# Patient Record
Sex: MALE | Race: WHITE | NOT HISPANIC OR LATINO | Employment: OTHER | URBAN - METROPOLITAN AREA
[De-identification: names, ages, dates, MRNs, and addresses within clinical notes are randomized per-mention and may not be internally consistent; named-entity substitution may affect disease eponyms.]

---

## 2017-01-01 ENCOUNTER — COMMUNICATION - HEALTHEAST (OUTPATIENT)
Dept: INTERNAL MEDICINE | Facility: CLINIC | Age: 47
End: 2017-01-01

## 2017-01-05 ENCOUNTER — COMMUNICATION - HEALTHEAST (OUTPATIENT)
Dept: UROLOGY | Facility: CLINIC | Age: 47
End: 2017-01-05

## 2017-01-05 ENCOUNTER — COMMUNICATION - HEALTHEAST (OUTPATIENT)
Dept: INTERNAL MEDICINE | Facility: CLINIC | Age: 47
End: 2017-01-05

## 2017-01-09 ENCOUNTER — COMMUNICATION - HEALTHEAST (OUTPATIENT)
Dept: INTERNAL MEDICINE | Facility: CLINIC | Age: 47
End: 2017-01-09

## 2017-01-09 DIAGNOSIS — S24.8XXD INJURY OF LONG THORACIC NERVE, SUBSEQUENT ENCOUNTER: ICD-10-CM

## 2017-01-23 ENCOUNTER — COMMUNICATION - HEALTHEAST (OUTPATIENT)
Dept: INTERNAL MEDICINE | Facility: CLINIC | Age: 47
End: 2017-01-23

## 2017-02-01 RX ORDER — LIDOCAINE 50 MG/G
OINTMENT TOPICAL
Qty: 35.44 G | Refills: 3 | Status: SHIPPED | OUTPATIENT
Start: 2017-02-01

## 2017-02-25 ENCOUNTER — COMMUNICATION - HEALTHEAST (OUTPATIENT)
Dept: INTERNAL MEDICINE | Facility: CLINIC | Age: 47
End: 2017-02-25

## 2017-02-25 DIAGNOSIS — N20.1 URETERAL STONE: ICD-10-CM

## 2017-03-10 ENCOUNTER — RECORDS - HEALTHEAST (OUTPATIENT)
Dept: ADMINISTRATIVE | Facility: OTHER | Age: 47
End: 2017-03-10

## 2017-03-27 ENCOUNTER — COMMUNICATION - HEALTHEAST (OUTPATIENT)
Dept: INTERNAL MEDICINE | Facility: CLINIC | Age: 47
End: 2017-03-27

## 2017-03-31 ENCOUNTER — COMMUNICATION - HEALTHEAST (OUTPATIENT)
Dept: INTERNAL MEDICINE | Facility: CLINIC | Age: 47
End: 2017-03-31

## 2017-03-31 DIAGNOSIS — R68.2 DRY MOUTH: ICD-10-CM

## 2017-04-17 ENCOUNTER — COMMUNICATION - HEALTHEAST (OUTPATIENT)
Dept: ADMINISTRATIVE | Facility: CLINIC | Age: 47
End: 2017-04-17

## 2017-04-26 ENCOUNTER — RECORDS - HEALTHEAST (OUTPATIENT)
Dept: ADMINISTRATIVE | Facility: OTHER | Age: 47
End: 2017-04-26

## 2017-04-28 ENCOUNTER — RECORDS - HEALTHEAST (OUTPATIENT)
Dept: ADMINISTRATIVE | Facility: OTHER | Age: 47
End: 2017-04-28

## 2017-04-30 ENCOUNTER — COMMUNICATION - HEALTHEAST (OUTPATIENT)
Dept: INTERNAL MEDICINE | Facility: CLINIC | Age: 47
End: 2017-04-30

## 2017-04-30 DIAGNOSIS — R52 PAIN: ICD-10-CM

## 2017-05-05 ENCOUNTER — COMMUNICATION - HEALTHEAST (OUTPATIENT)
Dept: INTERNAL MEDICINE | Facility: CLINIC | Age: 47
End: 2017-05-05

## 2017-05-05 ENCOUNTER — OFFICE VISIT - HEALTHEAST (OUTPATIENT)
Dept: INTERNAL MEDICINE | Facility: CLINIC | Age: 47
End: 2017-05-05

## 2017-05-05 DIAGNOSIS — M95.8 WINGED SCAPULA: ICD-10-CM

## 2017-05-05 DIAGNOSIS — R68.2 DRY MOUTH: ICD-10-CM

## 2017-05-05 ASSESSMENT — MIFFLIN-ST. JEOR: SCORE: 1667.99

## 2017-05-10 ENCOUNTER — RECORDS - HEALTHEAST (OUTPATIENT)
Dept: ADMINISTRATIVE | Facility: OTHER | Age: 47
End: 2017-05-10

## 2017-05-15 ENCOUNTER — COMMUNICATION - HEALTHEAST (OUTPATIENT)
Dept: INTERNAL MEDICINE | Facility: CLINIC | Age: 47
End: 2017-05-15

## 2017-05-27 ENCOUNTER — COMMUNICATION - HEALTHEAST (OUTPATIENT)
Dept: INTERNAL MEDICINE | Facility: CLINIC | Age: 47
End: 2017-05-27

## 2017-05-31 ENCOUNTER — COMMUNICATION - HEALTHEAST (OUTPATIENT)
Dept: INTERNAL MEDICINE | Facility: CLINIC | Age: 47
End: 2017-05-31

## 2017-05-31 DIAGNOSIS — F41.9 ANXIETY: ICD-10-CM

## 2017-06-07 ENCOUNTER — COMMUNICATION - HEALTHEAST (OUTPATIENT)
Dept: ADMINISTRATIVE | Facility: CLINIC | Age: 47
End: 2017-06-07

## 2017-06-19 ENCOUNTER — COMMUNICATION - HEALTHEAST (OUTPATIENT)
Dept: INTERNAL MEDICINE | Facility: CLINIC | Age: 47
End: 2017-06-19

## 2017-06-19 DIAGNOSIS — M95.8 WINGED SCAPULA: ICD-10-CM

## 2017-06-29 ENCOUNTER — COMMUNICATION - HEALTHEAST (OUTPATIENT)
Dept: INTERNAL MEDICINE | Facility: CLINIC | Age: 47
End: 2017-06-29

## 2017-06-30 ENCOUNTER — COMMUNICATION - HEALTHEAST (OUTPATIENT)
Dept: INTERNAL MEDICINE | Facility: CLINIC | Age: 47
End: 2017-06-30

## 2017-06-30 DIAGNOSIS — R68.2 DRY MOUTH: ICD-10-CM

## 2017-07-03 ENCOUNTER — RECORDS - HEALTHEAST (OUTPATIENT)
Dept: ADMINISTRATIVE | Facility: OTHER | Age: 47
End: 2017-07-03

## 2017-07-14 ENCOUNTER — COMMUNICATION - HEALTHEAST (OUTPATIENT)
Dept: INTERNAL MEDICINE | Facility: CLINIC | Age: 47
End: 2017-07-14

## 2017-07-17 ENCOUNTER — COMMUNICATION - HEALTHEAST (OUTPATIENT)
Dept: INTERNAL MEDICINE | Facility: CLINIC | Age: 47
End: 2017-07-17

## 2017-07-25 ENCOUNTER — COMMUNICATION - HEALTHEAST (OUTPATIENT)
Dept: INTERNAL MEDICINE | Facility: CLINIC | Age: 47
End: 2017-07-25

## 2017-08-04 ENCOUNTER — OFFICE VISIT - HEALTHEAST (OUTPATIENT)
Dept: INTERNAL MEDICINE | Facility: CLINIC | Age: 47
End: 2017-08-04

## 2017-08-04 DIAGNOSIS — M95.8 WINGED SCAPULA: ICD-10-CM

## 2017-08-17 ENCOUNTER — RECORDS - HEALTHEAST (OUTPATIENT)
Dept: ADMINISTRATIVE | Facility: OTHER | Age: 47
End: 2017-08-17

## 2017-08-23 ENCOUNTER — RECORDS - HEALTHEAST (OUTPATIENT)
Dept: ADMINISTRATIVE | Facility: OTHER | Age: 47
End: 2017-08-23

## 2017-09-28 ENCOUNTER — COMMUNICATION - HEALTHEAST (OUTPATIENT)
Dept: INTERNAL MEDICINE | Facility: CLINIC | Age: 47
End: 2017-09-28

## 2017-10-24 ENCOUNTER — COMMUNICATION - HEALTHEAST (OUTPATIENT)
Dept: INTERNAL MEDICINE | Facility: CLINIC | Age: 47
End: 2017-10-24

## 2017-10-24 DIAGNOSIS — R52 PAIN: ICD-10-CM

## 2017-10-26 ENCOUNTER — OFFICE VISIT - HEALTHEAST (OUTPATIENT)
Dept: INTERNAL MEDICINE | Facility: CLINIC | Age: 47
End: 2017-10-26

## 2017-10-26 DIAGNOSIS — M95.8 WINGED SCAPULA: ICD-10-CM

## 2017-10-27 ENCOUNTER — AMBULATORY - HEALTHEAST (OUTPATIENT)
Dept: INTERNAL MEDICINE | Facility: CLINIC | Age: 47
End: 2017-10-27

## 2017-10-27 ENCOUNTER — COMMUNICATION - HEALTHEAST (OUTPATIENT)
Dept: INTERNAL MEDICINE | Facility: CLINIC | Age: 47
End: 2017-10-27

## 2017-10-27 DIAGNOSIS — Z01.818 PRE-OP EXAM: ICD-10-CM

## 2017-10-28 ENCOUNTER — COMMUNICATION - HEALTHEAST (OUTPATIENT)
Dept: INTERNAL MEDICINE | Facility: CLINIC | Age: 47
End: 2017-10-28

## 2017-11-30 ENCOUNTER — COMMUNICATION - HEALTHEAST (OUTPATIENT)
Dept: INTERNAL MEDICINE | Facility: CLINIC | Age: 47
End: 2017-11-30

## 2017-11-30 DIAGNOSIS — F41.9 ANXIETY: ICD-10-CM

## 2017-12-07 ENCOUNTER — COMMUNICATION - HEALTHEAST (OUTPATIENT)
Dept: INTERNAL MEDICINE | Facility: CLINIC | Age: 47
End: 2017-12-07

## 2017-12-07 ENCOUNTER — OFFICE VISIT - HEALTHEAST (OUTPATIENT)
Dept: INTERNAL MEDICINE | Facility: CLINIC | Age: 47
End: 2017-12-07

## 2017-12-07 DIAGNOSIS — H60.90 OE (OTITIS EXTERNA): ICD-10-CM

## 2017-12-07 DIAGNOSIS — H91.90 HEARING LOSS: ICD-10-CM

## 2017-12-07 DIAGNOSIS — R68.2 DRY MOUTH: ICD-10-CM

## 2017-12-23 ENCOUNTER — COMMUNICATION - HEALTHEAST (OUTPATIENT)
Dept: INTERNAL MEDICINE | Facility: CLINIC | Age: 47
End: 2017-12-23

## 2017-12-23 DIAGNOSIS — R52 PAIN: ICD-10-CM

## 2017-12-29 ENCOUNTER — COMMUNICATION - HEALTHEAST (OUTPATIENT)
Dept: INTERNAL MEDICINE | Facility: CLINIC | Age: 47
End: 2017-12-29

## 2018-01-12 ENCOUNTER — COMMUNICATION - HEALTHEAST (OUTPATIENT)
Dept: INTERNAL MEDICINE | Facility: CLINIC | Age: 48
End: 2018-01-12

## 2018-01-12 DIAGNOSIS — S24.8XXS: ICD-10-CM

## 2018-01-12 DIAGNOSIS — S24.8XXS INJURY OF LONG THORACIC NERVE, SEQUELA: ICD-10-CM

## 2018-01-18 ENCOUNTER — OFFICE VISIT - HEALTHEAST (OUTPATIENT)
Dept: OTOLARYNGOLOGY | Facility: CLINIC | Age: 48
End: 2018-01-18

## 2018-01-18 ENCOUNTER — OFFICE VISIT - HEALTHEAST (OUTPATIENT)
Dept: AUDIOLOGY | Facility: CLINIC | Age: 48
End: 2018-01-18

## 2018-01-18 DIAGNOSIS — K21.9 GASTRIC REFLUX: ICD-10-CM

## 2018-01-18 DIAGNOSIS — J37.0 CHRONIC LARYNGITIS: ICD-10-CM

## 2018-01-18 DIAGNOSIS — G47.33 OSA (OBSTRUCTIVE SLEEP APNEA): ICD-10-CM

## 2018-01-18 DIAGNOSIS — H93.8X3 SENSATION OF FULLNESS IN BOTH EARS: ICD-10-CM

## 2018-01-18 DIAGNOSIS — H93.19 TINNITUS, UNSPECIFIED LATERALITY: ICD-10-CM

## 2018-01-18 DIAGNOSIS — G50.1 ATYPICAL FACE PAIN: ICD-10-CM

## 2018-01-19 ENCOUNTER — RECORDS - HEALTHEAST (OUTPATIENT)
Dept: ADMINISTRATIVE | Facility: OTHER | Age: 48
End: 2018-01-19

## 2018-01-27 ENCOUNTER — COMMUNICATION - HEALTHEAST (OUTPATIENT)
Dept: INTERNAL MEDICINE | Facility: CLINIC | Age: 48
End: 2018-01-27

## 2018-02-13 ENCOUNTER — RECORDS - HEALTHEAST (OUTPATIENT)
Dept: ADMINISTRATIVE | Facility: OTHER | Age: 48
End: 2018-02-13

## 2018-03-01 ENCOUNTER — COMMUNICATION - HEALTHEAST (OUTPATIENT)
Dept: INTERNAL MEDICINE | Facility: CLINIC | Age: 48
End: 2018-03-01

## 2018-03-05 ENCOUNTER — COMMUNICATION - HEALTHEAST (OUTPATIENT)
Dept: INTERNAL MEDICINE | Facility: CLINIC | Age: 48
End: 2018-03-05

## 2018-03-14 ENCOUNTER — COMMUNICATION - HEALTHEAST (OUTPATIENT)
Dept: INTERNAL MEDICINE | Facility: CLINIC | Age: 48
End: 2018-03-14

## 2018-03-20 ENCOUNTER — RECORDS - HEALTHEAST (OUTPATIENT)
Dept: ADMINISTRATIVE | Facility: OTHER | Age: 48
End: 2018-03-20

## 2018-03-20 ENCOUNTER — COMMUNICATION - HEALTHEAST (OUTPATIENT)
Dept: INTERNAL MEDICINE | Facility: CLINIC | Age: 48
End: 2018-03-20

## 2018-03-20 DIAGNOSIS — R68.2 DRY MOUTH: ICD-10-CM

## 2018-03-26 ENCOUNTER — COMMUNICATION - HEALTHEAST (OUTPATIENT)
Dept: INTERNAL MEDICINE | Facility: CLINIC | Age: 48
End: 2018-03-26

## 2018-03-26 DIAGNOSIS — R52 PAIN: ICD-10-CM

## 2018-03-27 ENCOUNTER — RECORDS - HEALTHEAST (OUTPATIENT)
Dept: ADMINISTRATIVE | Facility: OTHER | Age: 48
End: 2018-03-27

## 2018-04-02 ENCOUNTER — COMMUNICATION - HEALTHEAST (OUTPATIENT)
Dept: INTERNAL MEDICINE | Facility: CLINIC | Age: 48
End: 2018-04-02

## 2018-04-06 ENCOUNTER — RECORDS - HEALTHEAST (OUTPATIENT)
Dept: ADMINISTRATIVE | Facility: OTHER | Age: 48
End: 2018-04-06

## 2018-05-01 ENCOUNTER — COMMUNICATION - HEALTHEAST (OUTPATIENT)
Dept: INTERNAL MEDICINE | Facility: CLINIC | Age: 48
End: 2018-05-01

## 2018-05-31 ENCOUNTER — OFFICE VISIT - HEALTHEAST (OUTPATIENT)
Dept: INTERNAL MEDICINE | Facility: CLINIC | Age: 48
End: 2018-05-31

## 2018-05-31 DIAGNOSIS — M95.8 WINGED SCAPULA: ICD-10-CM

## 2018-06-01 ENCOUNTER — COMMUNICATION - HEALTHEAST (OUTPATIENT)
Dept: INTERNAL MEDICINE | Facility: CLINIC | Age: 48
End: 2018-06-01

## 2018-06-01 DIAGNOSIS — R52 PAIN: ICD-10-CM

## 2018-06-01 DIAGNOSIS — R68.2 DRY MOUTH: ICD-10-CM

## 2018-06-11 ENCOUNTER — RECORDS - HEALTHEAST (OUTPATIENT)
Dept: ADMINISTRATIVE | Facility: OTHER | Age: 48
End: 2018-06-11

## 2018-06-12 ENCOUNTER — RECORDS - HEALTHEAST (OUTPATIENT)
Dept: ADMINISTRATIVE | Facility: OTHER | Age: 48
End: 2018-06-12

## 2018-06-25 ENCOUNTER — RECORDS - HEALTHEAST (OUTPATIENT)
Dept: ADMINISTRATIVE | Facility: OTHER | Age: 48
End: 2018-06-25

## 2018-07-01 ENCOUNTER — COMMUNICATION - HEALTHEAST (OUTPATIENT)
Dept: INTERNAL MEDICINE | Facility: CLINIC | Age: 48
End: 2018-07-01

## 2018-07-12 ENCOUNTER — COMMUNICATION - HEALTHEAST (OUTPATIENT)
Dept: INTERNAL MEDICINE | Facility: CLINIC | Age: 48
End: 2018-07-12

## 2018-07-12 ENCOUNTER — RECORDS - HEALTHEAST (OUTPATIENT)
Dept: GENERAL RADIOLOGY | Facility: CLINIC | Age: 48
End: 2018-07-12

## 2018-07-12 ENCOUNTER — OFFICE VISIT - HEALTHEAST (OUTPATIENT)
Dept: INTERNAL MEDICINE | Facility: CLINIC | Age: 48
End: 2018-07-12

## 2018-07-12 DIAGNOSIS — M25.561 PAIN IN RIGHT KNEE: ICD-10-CM

## 2018-07-12 DIAGNOSIS — M26.609 TMJ (TEMPOROMANDIBULAR JOINT SYNDROME): ICD-10-CM

## 2018-07-12 DIAGNOSIS — M25.562 LEFT KNEE PAIN: ICD-10-CM

## 2018-07-12 DIAGNOSIS — M25.562 PAIN IN LEFT KNEE: ICD-10-CM

## 2018-07-12 DIAGNOSIS — F11.90 CHRONIC, CONTINUOUS USE OF OPIOIDS: ICD-10-CM

## 2018-07-12 DIAGNOSIS — Z02.89 PAIN MEDICATION AGREEMENT: ICD-10-CM

## 2018-07-12 DIAGNOSIS — Z79.899 CONTROLLED SUBSTANCE AGREEMENT SIGNED: ICD-10-CM

## 2018-07-12 DIAGNOSIS — M25.561 RIGHT KNEE PAIN: ICD-10-CM

## 2018-07-12 DIAGNOSIS — M12.9 ARTHROPATHY, MULTIPLE SITES: ICD-10-CM

## 2018-07-12 LAB
AMPHETAMINES UR QL SCN: ABNORMAL
BARBITURATES UR QL: ABNORMAL
BENZODIAZ UR QL: ABNORMAL
C REACTIVE PROTEIN LHE: 0.2 MG/DL (ref 0–0.8)
CANNABINOIDS UR QL SCN: ABNORMAL
CCP AB SER IA-ACNC: <0.5 U/ML
COCAINE UR QL: ABNORMAL
CREAT UR-MCNC: 198.3 MG/DL
ERYTHROCYTE [DISTWIDTH] IN BLOOD BY AUTOMATED COUNT: 11.9 % (ref 11–14.5)
ERYTHROCYTE [SEDIMENTATION RATE] IN BLOOD BY WESTERGREN METHOD: 2 MM/HR (ref 0–15)
HCT VFR BLD AUTO: 45.3 % (ref 40–54)
HGB BLD-MCNC: 15 G/DL (ref 14–18)
MCH RBC QN AUTO: 30.5 PG (ref 27–34)
MCHC RBC AUTO-ENTMCNC: 33.1 G/DL (ref 32–36)
MCV RBC AUTO: 92 FL (ref 80–100)
OPIATES UR QL SCN: ABNORMAL
OXYCODONE UR QL: ABNORMAL
PCP UR QL SCN: ABNORMAL
PLATELET # BLD AUTO: 284 THOU/UL (ref 140–440)
PMV BLD AUTO: 7.9 FL (ref 7–10)
RBC # BLD AUTO: 4.92 MILL/UL (ref 4.4–6.2)
RHEUMATOID FACT SERPL-ACNC: <15 IU/ML (ref 0–30)
WBC: 8.7 THOU/UL (ref 4–11)

## 2018-07-15 ENCOUNTER — COMMUNICATION - HEALTHEAST (OUTPATIENT)
Dept: INTERNAL MEDICINE | Facility: CLINIC | Age: 48
End: 2018-07-15

## 2018-07-16 LAB — ANA SER QL: 2.7 U

## 2018-07-19 ENCOUNTER — OFFICE VISIT - HEALTHEAST (OUTPATIENT)
Dept: RHEUMATOLOGY | Facility: CLINIC | Age: 48
End: 2018-07-19

## 2018-07-19 DIAGNOSIS — M25.50 POLYARTHRALGIA: ICD-10-CM

## 2018-07-20 LAB — HCV AB SERPL QL IA: NEGATIVE

## 2018-07-23 ENCOUNTER — COMMUNICATION - HEALTHEAST (OUTPATIENT)
Dept: ADMINISTRATIVE | Facility: CLINIC | Age: 48
End: 2018-07-23

## 2018-07-23 LAB — ANCA IGG TITR SER IF: NORMAL {TITER}

## 2018-07-25 ENCOUNTER — OFFICE VISIT - HEALTHEAST (OUTPATIENT)
Dept: RHEUMATOLOGY | Facility: CLINIC | Age: 48
End: 2018-07-25

## 2018-07-25 DIAGNOSIS — M25.50 POLYARTHRALGIA: ICD-10-CM

## 2018-08-03 ENCOUNTER — COMMUNICATION - HEALTHEAST (OUTPATIENT)
Dept: INTERNAL MEDICINE | Facility: CLINIC | Age: 48
End: 2018-08-03

## 2018-08-06 ENCOUNTER — OFFICE VISIT - HEALTHEAST (OUTPATIENT)
Dept: INTERNAL MEDICINE | Facility: CLINIC | Age: 48
End: 2018-08-06

## 2018-08-06 DIAGNOSIS — M95.8 WINGED SCAPULA: ICD-10-CM

## 2018-08-06 DIAGNOSIS — M53.3 SI (SACROILIAC) JOINT DYSFUNCTION: ICD-10-CM

## 2018-08-08 ENCOUNTER — HOSPITAL ENCOUNTER (OUTPATIENT)
Dept: CT IMAGING | Facility: CLINIC | Age: 48
Discharge: HOME | End: 2018-08-08
Attending: INTERNAL MEDICINE | Admitting: RADIOLOGY

## 2018-08-08 DIAGNOSIS — M53.3 SI (SACROILIAC) JOINT DYSFUNCTION: ICD-10-CM

## 2018-08-26 ENCOUNTER — COMMUNICATION - HEALTHEAST (OUTPATIENT)
Dept: INTERNAL MEDICINE | Facility: CLINIC | Age: 48
End: 2018-08-26

## 2018-08-26 DIAGNOSIS — R52 PAIN: ICD-10-CM

## 2018-08-29 ENCOUNTER — COMMUNICATION - HEALTHEAST (OUTPATIENT)
Dept: INTERNAL MEDICINE | Facility: CLINIC | Age: 48
End: 2018-08-29

## 2018-08-29 DIAGNOSIS — M12.9 ARTHROPATHY, MULTIPLE SITES: ICD-10-CM

## 2018-09-06 ENCOUNTER — OFFICE VISIT - HEALTHEAST (OUTPATIENT)
Dept: INTERNAL MEDICINE | Facility: CLINIC | Age: 48
End: 2018-09-06

## 2018-09-06 DIAGNOSIS — M53.3 SI (SACROILIAC) JOINT DYSFUNCTION: ICD-10-CM

## 2018-09-12 ENCOUNTER — HOSPITAL ENCOUNTER (OUTPATIENT)
Dept: CT IMAGING | Facility: CLINIC | Age: 48
Discharge: HOME | End: 2018-09-12
Attending: INTERNAL MEDICINE | Admitting: RADIOLOGY

## 2018-09-12 DIAGNOSIS — M53.3 SI (SACROILIAC) JOINT DYSFUNCTION: ICD-10-CM

## 2018-10-01 ENCOUNTER — COMMUNICATION - HEALTHEAST (OUTPATIENT)
Dept: INTERNAL MEDICINE | Facility: CLINIC | Age: 48
End: 2018-10-01

## 2018-10-02 ENCOUNTER — COMMUNICATION - HEALTHEAST (OUTPATIENT)
Dept: INTERNAL MEDICINE | Facility: CLINIC | Age: 48
End: 2018-10-02

## 2018-10-02 DIAGNOSIS — M12.9 ARTHROPATHY, MULTIPLE SITES: ICD-10-CM

## 2018-10-03 ENCOUNTER — OFFICE VISIT - HEALTHEAST (OUTPATIENT)
Dept: INTERNAL MEDICINE | Facility: CLINIC | Age: 48
End: 2018-10-03

## 2018-10-03 DIAGNOSIS — F50.20 BULIMIA NERVOSA: ICD-10-CM

## 2018-10-03 DIAGNOSIS — F43.10 POSTTRAUMATIC STRESS DISORDER: ICD-10-CM

## 2018-10-03 ASSESSMENT — MIFFLIN-ST. JEOR: SCORE: 1730.36

## 2018-10-04 ENCOUNTER — COMMUNICATION - HEALTHEAST (OUTPATIENT)
Dept: LAB | Facility: CLINIC | Age: 48
End: 2018-10-04

## 2018-10-04 DIAGNOSIS — Z13.29 SCREENING FOR ENDOCRINE DISORDER: ICD-10-CM

## 2018-10-05 ENCOUNTER — AMBULATORY - HEALTHEAST (OUTPATIENT)
Dept: LAB | Facility: CLINIC | Age: 48
End: 2018-10-05

## 2018-10-05 DIAGNOSIS — Z13.29 SCREENING FOR ENDOCRINE DISORDER: ICD-10-CM

## 2018-10-05 LAB
CHOLEST SERPL-MCNC: 180 MG/DL
FASTING STATUS PATIENT QL REPORTED: YES
FASTING STATUS PATIENT QL REPORTED: YES
GLUCOSE BLD-MCNC: 95 MG/DL (ref 70–125)
HDLC SERPL-MCNC: 45 MG/DL
LDLC SERPL CALC-MCNC: 108 MG/DL
TRIGL SERPL-MCNC: 136 MG/DL

## 2018-10-08 ENCOUNTER — COMMUNICATION - HEALTHEAST (OUTPATIENT)
Dept: INTERNAL MEDICINE | Facility: CLINIC | Age: 48
End: 2018-10-08

## 2018-10-17 ENCOUNTER — RECORDS - HEALTHEAST (OUTPATIENT)
Dept: ADMINISTRATIVE | Facility: OTHER | Age: 48
End: 2018-10-17

## 2018-10-18 ENCOUNTER — OFFICE VISIT - HEALTHEAST (OUTPATIENT)
Dept: INTERNAL MEDICINE | Facility: CLINIC | Age: 48
End: 2018-10-18

## 2018-10-18 DIAGNOSIS — M53.3 SI (SACROILIAC) JOINT DYSFUNCTION: ICD-10-CM

## 2018-10-18 DIAGNOSIS — M95.8 WINGED SCAPULA: ICD-10-CM

## 2018-10-22 ENCOUNTER — HOSPITAL ENCOUNTER (OUTPATIENT)
Dept: MRI IMAGING | Facility: CLINIC | Age: 48
Discharge: HOME | End: 2018-10-22
Attending: INTERNAL MEDICINE

## 2018-10-22 DIAGNOSIS — M53.3 SI (SACROILIAC) JOINT DYSFUNCTION: ICD-10-CM

## 2018-10-31 ENCOUNTER — COMMUNICATION - HEALTHEAST (OUTPATIENT)
Dept: INTERNAL MEDICINE | Facility: CLINIC | Age: 48
End: 2018-10-31

## 2018-11-05 ENCOUNTER — COMMUNICATION - HEALTHEAST (OUTPATIENT)
Dept: INTERNAL MEDICINE | Facility: CLINIC | Age: 48
End: 2018-11-05

## 2018-11-10 ENCOUNTER — COMMUNICATION - HEALTHEAST (OUTPATIENT)
Dept: INTERNAL MEDICINE | Facility: CLINIC | Age: 48
End: 2018-11-10

## 2018-11-10 DIAGNOSIS — R52 PAIN: ICD-10-CM

## 2018-11-12 ENCOUNTER — OFFICE VISIT - HEALTHEAST (OUTPATIENT)
Dept: INTERNAL MEDICINE | Facility: CLINIC | Age: 48
End: 2018-11-12

## 2018-11-12 DIAGNOSIS — M46.1 SI JOINT ARTHRITIS (H): ICD-10-CM

## 2018-11-27 ENCOUNTER — RECORDS - HEALTHEAST (OUTPATIENT)
Dept: ADMINISTRATIVE | Facility: OTHER | Age: 48
End: 2018-11-27

## 2018-12-03 ENCOUNTER — COMMUNICATION - HEALTHEAST (OUTPATIENT)
Dept: INTERNAL MEDICINE | Facility: CLINIC | Age: 48
End: 2018-12-03

## 2018-12-03 DIAGNOSIS — M12.9 ARTHROPATHY, MULTIPLE SITES: ICD-10-CM

## 2018-12-06 ENCOUNTER — COMMUNICATION - HEALTHEAST (OUTPATIENT)
Dept: INTERNAL MEDICINE | Facility: CLINIC | Age: 48
End: 2018-12-06

## 2018-12-06 DIAGNOSIS — M12.9 ARTHROPATHY, MULTIPLE SITES: ICD-10-CM

## 2018-12-14 ENCOUNTER — RECORDS - HEALTHEAST (OUTPATIENT)
Dept: ADMINISTRATIVE | Facility: OTHER | Age: 48
End: 2018-12-14

## 2018-12-19 ENCOUNTER — OFFICE VISIT - HEALTHEAST (OUTPATIENT)
Dept: INTERNAL MEDICINE | Facility: CLINIC | Age: 48
End: 2018-12-19

## 2018-12-19 DIAGNOSIS — M25.50 PAIN IN JOINT, MULTIPLE SITES: ICD-10-CM

## 2019-01-01 ENCOUNTER — COMMUNICATION - HEALTHEAST (OUTPATIENT)
Dept: INTERNAL MEDICINE | Facility: CLINIC | Age: 49
End: 2019-01-01

## 2019-01-01 DIAGNOSIS — R52 PAIN: ICD-10-CM

## 2019-01-02 ENCOUNTER — COMMUNICATION - HEALTHEAST (OUTPATIENT)
Dept: INTERNAL MEDICINE | Facility: CLINIC | Age: 49
End: 2019-01-02

## 2019-01-02 DIAGNOSIS — M25.511 CHRONIC RIGHT SHOULDER PAIN: ICD-10-CM

## 2019-01-02 DIAGNOSIS — M54.9 BACK PAIN, UNSPECIFIED BACK LOCATION, UNSPECIFIED BACK PAIN LATERALITY, UNSPECIFIED CHRONICITY: ICD-10-CM

## 2019-01-02 DIAGNOSIS — G89.29 CHRONIC RIGHT SHOULDER PAIN: ICD-10-CM

## 2019-01-02 DIAGNOSIS — M12.9 ARTHROPATHY, MULTIPLE SITES: ICD-10-CM

## 2019-01-03 ENCOUNTER — RECORDS - HEALTHEAST (OUTPATIENT)
Dept: ADMINISTRATIVE | Facility: OTHER | Age: 49
End: 2019-01-03

## 2019-01-08 ENCOUNTER — RECORDS - HEALTHEAST (OUTPATIENT)
Dept: ADMINISTRATIVE | Facility: OTHER | Age: 49
End: 2019-01-08

## 2019-01-15 ENCOUNTER — OFFICE VISIT - HEALTHEAST (OUTPATIENT)
Dept: INTERNAL MEDICINE | Facility: CLINIC | Age: 49
End: 2019-01-15

## 2019-01-15 DIAGNOSIS — R19.7 DIARRHEA, UNSPECIFIED TYPE: ICD-10-CM

## 2019-01-15 DIAGNOSIS — M95.8 WINGED SCAPULA: ICD-10-CM

## 2019-01-15 LAB
BASOPHILS # BLD AUTO: 0 THOU/UL (ref 0–0.2)
BASOPHILS NFR BLD AUTO: 1 % (ref 0–2)
C REACTIVE PROTEIN LHE: 2 MG/DL (ref 0–0.8)
EOSINOPHIL # BLD AUTO: 0.1 THOU/UL (ref 0–0.4)
EOSINOPHIL NFR BLD AUTO: 2 % (ref 0–6)
ERYTHROCYTE [DISTWIDTH] IN BLOOD BY AUTOMATED COUNT: 11.8 % (ref 11–14.5)
ERYTHROCYTE [SEDIMENTATION RATE] IN BLOOD BY WESTERGREN METHOD: 18 MM/HR (ref 0–15)
HCT VFR BLD AUTO: 43.2 % (ref 40–54)
HGB BLD-MCNC: 14.2 G/DL (ref 14–18)
HIV 1+2 AB+HIV1 P24 AG SERPL QL IA: NEGATIVE
LYMPHOCYTES # BLD AUTO: 2 THOU/UL (ref 0.8–4.4)
LYMPHOCYTES NFR BLD AUTO: 30 % (ref 20–40)
MCH RBC QN AUTO: 29.5 PG (ref 27–34)
MCHC RBC AUTO-ENTMCNC: 32.8 G/DL (ref 32–36)
MCV RBC AUTO: 90 FL (ref 80–100)
MONOCYTES # BLD AUTO: 0.6 THOU/UL (ref 0–0.9)
MONOCYTES NFR BLD AUTO: 10 % (ref 2–10)
NEUTROPHILS # BLD AUTO: 3.8 THOU/UL (ref 2–7.7)
NEUTROPHILS NFR BLD AUTO: 58 % (ref 50–70)
PLATELET # BLD AUTO: 333 THOU/UL (ref 140–440)
PMV BLD AUTO: 7.9 FL (ref 7–10)
RBC # BLD AUTO: 4.8 MILL/UL (ref 4.4–6.2)
WBC: 6.5 THOU/UL (ref 4–11)

## 2019-01-16 ENCOUNTER — AMBULATORY - HEALTHEAST (OUTPATIENT)
Dept: LAB | Facility: CLINIC | Age: 49
End: 2019-01-16

## 2019-01-16 DIAGNOSIS — R19.7 DIARRHEA, UNSPECIFIED TYPE: ICD-10-CM

## 2019-01-16 LAB
C DIFF TOX B STL QL: NEGATIVE
RIBOTYPE 027/NAP1/BI: NORMAL

## 2019-01-17 LAB
O+P STL MICRO: NORMAL
SHIGA TOXIN 1: NEGATIVE
SHIGA TOXIN 2: NEGATIVE
WBC STL QL MICRO: NORMAL

## 2019-01-19 LAB — BACTERIA SPEC CULT: NORMAL

## 2019-01-21 ENCOUNTER — COMMUNICATION - HEALTHEAST (OUTPATIENT)
Dept: INTERNAL MEDICINE | Facility: CLINIC | Age: 49
End: 2019-01-21

## 2019-01-21 DIAGNOSIS — R19.7 DIARRHEA: ICD-10-CM

## 2019-01-25 ENCOUNTER — COMMUNICATION - HEALTHEAST (OUTPATIENT)
Dept: INTERNAL MEDICINE | Facility: CLINIC | Age: 49
End: 2019-01-25

## 2019-01-29 ENCOUNTER — COMMUNICATION - HEALTHEAST (OUTPATIENT)
Dept: INTERNAL MEDICINE | Facility: CLINIC | Age: 49
End: 2019-01-29

## 2019-01-29 DIAGNOSIS — M12.9 ARTHROPATHY, MULTIPLE SITES: ICD-10-CM

## 2019-02-01 ENCOUNTER — RECORDS - HEALTHEAST (OUTPATIENT)
Dept: ADMINISTRATIVE | Facility: OTHER | Age: 49
End: 2019-02-01

## 2019-02-04 ENCOUNTER — COMMUNICATION - HEALTHEAST (OUTPATIENT)
Dept: INTERNAL MEDICINE | Facility: CLINIC | Age: 49
End: 2019-02-04

## 2019-02-04 DIAGNOSIS — S24.8XXS TRAUMATIC LONG THORACIC NERVE PALSY, SEQUELA: ICD-10-CM

## 2019-02-05 ENCOUNTER — OFFICE VISIT - HEALTHEAST (OUTPATIENT)
Dept: INTERNAL MEDICINE | Facility: CLINIC | Age: 49
End: 2019-02-05

## 2019-02-05 DIAGNOSIS — M95.8 WINGED SCAPULA: ICD-10-CM

## 2019-02-05 DIAGNOSIS — K52.831 COLLAGENOUS COLITIS: ICD-10-CM

## 2019-02-21 ENCOUNTER — OFFICE VISIT - HEALTHEAST (OUTPATIENT)
Dept: INTERNAL MEDICINE | Facility: CLINIC | Age: 49
End: 2019-02-21

## 2019-02-21 ENCOUNTER — COMMUNICATION - HEALTHEAST (OUTPATIENT)
Dept: SCHEDULING | Facility: CLINIC | Age: 49
End: 2019-02-21

## 2019-02-21 DIAGNOSIS — M12.9 ARTHROPATHY, MULTIPLE SITES: ICD-10-CM

## 2019-02-21 DIAGNOSIS — R52 PAIN: ICD-10-CM

## 2019-02-21 DIAGNOSIS — N20.1 URETERAL STONE: ICD-10-CM

## 2019-02-26 ENCOUNTER — COMMUNICATION - HEALTHEAST (OUTPATIENT)
Dept: INTERNAL MEDICINE | Facility: CLINIC | Age: 49
End: 2019-02-26

## 2019-03-31 ENCOUNTER — COMMUNICATION - HEALTHEAST (OUTPATIENT)
Dept: INTERNAL MEDICINE | Facility: CLINIC | Age: 49
End: 2019-03-31

## 2019-03-31 DIAGNOSIS — M12.9 ARTHROPATHY, MULTIPLE SITES: ICD-10-CM

## 2019-04-21 ENCOUNTER — COMMUNICATION - HEALTHEAST (OUTPATIENT)
Dept: INTERNAL MEDICINE | Facility: CLINIC | Age: 49
End: 2019-04-21

## 2019-04-21 DIAGNOSIS — R68.2 DRY MOUTH: ICD-10-CM

## 2019-04-30 ENCOUNTER — COMMUNICATION - HEALTHEAST (OUTPATIENT)
Dept: INTERNAL MEDICINE | Facility: CLINIC | Age: 49
End: 2019-04-30

## 2019-04-30 DIAGNOSIS — R68.2 DRY MOUTH: ICD-10-CM

## 2019-05-06 ENCOUNTER — COMMUNICATION - HEALTHEAST (OUTPATIENT)
Dept: INTERNAL MEDICINE | Facility: CLINIC | Age: 49
End: 2019-05-06

## 2019-05-06 DIAGNOSIS — M12.9 ARTHROPATHY, MULTIPLE SITES: ICD-10-CM

## 2019-06-05 ENCOUNTER — COMMUNICATION - HEALTHEAST (OUTPATIENT)
Dept: INTERNAL MEDICINE | Facility: CLINIC | Age: 49
End: 2019-06-05

## 2019-06-05 DIAGNOSIS — M12.9 ARTHROPATHY, MULTIPLE SITES: ICD-10-CM

## 2019-06-07 ENCOUNTER — OFFICE VISIT - HEALTHEAST (OUTPATIENT)
Dept: INTERNAL MEDICINE | Facility: CLINIC | Age: 49
End: 2019-06-07

## 2019-06-07 DIAGNOSIS — M12.9 ARTHROPATHY, MULTIPLE SITES: ICD-10-CM

## 2019-06-07 DIAGNOSIS — R20.8 DYSESTHESIA: ICD-10-CM

## 2019-06-07 DIAGNOSIS — K52.831 COLLAGENOUS COLITIS: ICD-10-CM

## 2019-06-11 ENCOUNTER — RECORDS - HEALTHEAST (OUTPATIENT)
Dept: ADMINISTRATIVE | Facility: OTHER | Age: 49
End: 2019-06-11

## 2019-07-04 ENCOUNTER — RECORDS - HEALTHEAST (OUTPATIENT)
Dept: ADMINISTRATIVE | Facility: OTHER | Age: 49
End: 2019-07-04

## 2019-07-05 ENCOUNTER — COMMUNICATION - HEALTHEAST (OUTPATIENT)
Dept: INTERNAL MEDICINE | Facility: CLINIC | Age: 49
End: 2019-07-05

## 2019-07-05 DIAGNOSIS — M95.8 WINGED SCAPULA: ICD-10-CM

## 2019-07-10 ENCOUNTER — COMMUNICATION - HEALTHEAST (OUTPATIENT)
Dept: INTERNAL MEDICINE | Facility: CLINIC | Age: 49
End: 2019-07-10

## 2019-07-12 ENCOUNTER — OFFICE VISIT - HEALTHEAST (OUTPATIENT)
Dept: INTERNAL MEDICINE | Facility: CLINIC | Age: 49
End: 2019-07-12

## 2019-07-12 DIAGNOSIS — Z02.89 PAIN MEDICATION AGREEMENT: ICD-10-CM

## 2019-07-12 DIAGNOSIS — F51.01 PRIMARY INSOMNIA: ICD-10-CM

## 2019-07-23 ENCOUNTER — COMMUNICATION - HEALTHEAST (OUTPATIENT)
Dept: PHARMACY | Facility: CLINIC | Age: 49
End: 2019-07-23

## 2019-08-01 ENCOUNTER — COMMUNICATION - HEALTHEAST (OUTPATIENT)
Dept: INTERNAL MEDICINE | Facility: CLINIC | Age: 49
End: 2019-08-01

## 2019-08-01 DIAGNOSIS — M12.9 ARTHROPATHY, MULTIPLE SITES: ICD-10-CM

## 2019-08-14 ENCOUNTER — COMMUNICATION - HEALTHEAST (OUTPATIENT)
Dept: PHARMACY | Facility: CLINIC | Age: 49
End: 2019-08-14

## 2019-08-22 ENCOUNTER — OFFICE VISIT - HEALTHEAST (OUTPATIENT)
Dept: INTERNAL MEDICINE | Facility: CLINIC | Age: 49
End: 2019-08-22

## 2019-08-22 DIAGNOSIS — B36.0 TINEA VERSICOLOR: ICD-10-CM

## 2019-08-22 DIAGNOSIS — M95.8 WINGED SCAPULA: ICD-10-CM

## 2019-08-22 DIAGNOSIS — M19.019 AC JOINT ARTHROPATHY: ICD-10-CM

## 2019-08-22 DIAGNOSIS — R20.8 DYSESTHESIA: ICD-10-CM

## 2019-08-22 DIAGNOSIS — M12.9 ARTHROPATHY, MULTIPLE SITES: ICD-10-CM

## 2019-09-04 ENCOUNTER — COMMUNICATION - HEALTHEAST (OUTPATIENT)
Dept: PHARMACY | Facility: CLINIC | Age: 49
End: 2019-09-04

## 2019-09-11 ENCOUNTER — COMMUNICATION - HEALTHEAST (OUTPATIENT)
Dept: INTERNAL MEDICINE | Facility: CLINIC | Age: 49
End: 2019-09-11

## 2019-09-11 DIAGNOSIS — R21 RASH: ICD-10-CM

## 2019-09-28 ENCOUNTER — COMMUNICATION - HEALTHEAST (OUTPATIENT)
Dept: INTERNAL MEDICINE | Facility: CLINIC | Age: 49
End: 2019-09-28

## 2019-09-28 DIAGNOSIS — M95.8 WINGED SCAPULA: ICD-10-CM

## 2019-10-01 ENCOUNTER — COMMUNICATION - HEALTHEAST (OUTPATIENT)
Dept: INTERNAL MEDICINE | Facility: CLINIC | Age: 49
End: 2019-10-01

## 2019-10-01 DIAGNOSIS — M12.9 ARTHROPATHY, MULTIPLE SITES: ICD-10-CM

## 2019-10-14 ENCOUNTER — COMMUNICATION - HEALTHEAST (OUTPATIENT)
Dept: INTERNAL MEDICINE | Facility: CLINIC | Age: 49
End: 2019-10-14

## 2019-10-15 ENCOUNTER — OFFICE VISIT - HEALTHEAST (OUTPATIENT)
Dept: INTERNAL MEDICINE | Facility: CLINIC | Age: 49
End: 2019-10-15

## 2019-10-15 ENCOUNTER — RECORDS - HEALTHEAST (OUTPATIENT)
Dept: GENERAL RADIOLOGY | Facility: CLINIC | Age: 49
End: 2019-10-15

## 2019-10-15 DIAGNOSIS — M53.3 SACROILIAC JOINT PAIN: ICD-10-CM

## 2019-10-15 DIAGNOSIS — F41.1 GENERALIZED ANXIETY DISORDER: ICD-10-CM

## 2019-10-15 DIAGNOSIS — M53.3 SACROCOCCYGEAL DISORDERS, NOT ELSEWHERE CLASSIFIED: ICD-10-CM

## 2019-10-15 DIAGNOSIS — M95.8 WINGED SCAPULA: ICD-10-CM

## 2019-10-15 LAB
ANION GAP SERPL CALCULATED.3IONS-SCNC: 7 MMOL/L (ref 5–18)
BUN SERPL-MCNC: 28 MG/DL (ref 8–22)
CALCIUM SERPL-MCNC: 10 MG/DL (ref 8.5–10.5)
CHLORIDE BLD-SCNC: 104 MMOL/L (ref 98–107)
CO2 SERPL-SCNC: 29 MMOL/L (ref 22–31)
CREAT SERPL-MCNC: 1.05 MG/DL (ref 0.7–1.3)
GFR SERPL CREATININE-BSD FRML MDRD: >60 ML/MIN/1.73M2
GLUCOSE BLD-MCNC: 79 MG/DL (ref 70–125)
POTASSIUM BLD-SCNC: 4.3 MMOL/L (ref 3.5–5)
SODIUM SERPL-SCNC: 140 MMOL/L (ref 136–145)

## 2019-10-16 ENCOUNTER — COMMUNICATION - HEALTHEAST (OUTPATIENT)
Dept: INTERNAL MEDICINE | Facility: CLINIC | Age: 49
End: 2019-10-16

## 2019-10-17 ENCOUNTER — RECORDS - HEALTHEAST (OUTPATIENT)
Dept: ADMINISTRATIVE | Facility: OTHER | Age: 49
End: 2019-10-17

## 2019-10-18 ENCOUNTER — COMMUNICATION - HEALTHEAST (OUTPATIENT)
Dept: TELEHEALTH | Facility: CLINIC | Age: 49
End: 2019-10-18

## 2019-10-18 ENCOUNTER — HOSPITAL ENCOUNTER (OUTPATIENT)
Dept: CT IMAGING | Facility: HOSPITAL | Age: 49
Discharge: HOME | End: 2019-10-18
Attending: INTERNAL MEDICINE | Admitting: RADIOLOGY

## 2019-10-18 ENCOUNTER — OFFICE VISIT - HEALTHEAST (OUTPATIENT)
Dept: INTERNAL MEDICINE | Facility: CLINIC | Age: 49
End: 2019-10-18

## 2019-10-18 DIAGNOSIS — M53.3 SACROILIAC JOINT PAIN: ICD-10-CM

## 2019-10-18 DIAGNOSIS — Z00.00 ROUTINE GENERAL MEDICAL EXAMINATION AT A HEALTH CARE FACILITY: ICD-10-CM

## 2019-10-28 ENCOUNTER — COMMUNICATION - HEALTHEAST (OUTPATIENT)
Dept: INTERNAL MEDICINE | Facility: CLINIC | Age: 49
End: 2019-10-28

## 2019-10-28 DIAGNOSIS — M95.8 WINGED SCAPULA: ICD-10-CM

## 2019-11-02 ENCOUNTER — COMMUNICATION - HEALTHEAST (OUTPATIENT)
Dept: INTERNAL MEDICINE | Facility: CLINIC | Age: 49
End: 2019-11-02

## 2019-11-02 DIAGNOSIS — M12.9 ARTHROPATHY, MULTIPLE SITES: ICD-10-CM

## 2019-11-06 ENCOUNTER — COMMUNICATION - HEALTHEAST (OUTPATIENT)
Dept: PHARMACY | Facility: CLINIC | Age: 49
End: 2019-11-06

## 2019-11-12 ENCOUNTER — COMMUNICATION - HEALTHEAST (OUTPATIENT)
Dept: INTERNAL MEDICINE | Facility: CLINIC | Age: 49
End: 2019-11-12

## 2019-11-12 DIAGNOSIS — Q39.6 ESOPHAGEAL DIVERTICULUM: ICD-10-CM

## 2019-11-13 ENCOUNTER — HOSPITAL ENCOUNTER (OUTPATIENT)
Dept: RADIOLOGY | Facility: CLINIC | Age: 49
Discharge: HOME | End: 2019-11-13
Attending: INTERNAL MEDICINE

## 2019-11-13 DIAGNOSIS — Q39.6 ESOPHAGEAL DIVERTICULUM: ICD-10-CM

## 2019-11-19 ENCOUNTER — COMMUNICATION - HEALTHEAST (OUTPATIENT)
Dept: INTERNAL MEDICINE | Facility: CLINIC | Age: 49
End: 2019-11-19

## 2019-11-22 ENCOUNTER — OFFICE VISIT - HEALTHEAST (OUTPATIENT)
Dept: INTERNAL MEDICINE | Facility: CLINIC | Age: 49
End: 2019-11-22

## 2019-11-22 DIAGNOSIS — K22.5 ZENKER'S DIVERTICULUM: ICD-10-CM

## 2019-11-22 DIAGNOSIS — M95.8 WINGED SCAPULA: ICD-10-CM

## 2019-11-23 ENCOUNTER — RECORDS - HEALTHEAST (OUTPATIENT)
Dept: ADMINISTRATIVE | Facility: OTHER | Age: 49
End: 2019-11-23

## 2019-11-27 ENCOUNTER — COMMUNICATION - HEALTHEAST (OUTPATIENT)
Dept: INTERNAL MEDICINE | Facility: CLINIC | Age: 49
End: 2019-11-27

## 2019-11-27 DIAGNOSIS — M95.8 WINGED SCAPULA: ICD-10-CM

## 2019-12-02 ENCOUNTER — COMMUNICATION - HEALTHEAST (OUTPATIENT)
Dept: INTERNAL MEDICINE | Facility: CLINIC | Age: 49
End: 2019-12-02

## 2019-12-02 ENCOUNTER — COMMUNICATION - HEALTHEAST (OUTPATIENT)
Dept: PHARMACY | Facility: CLINIC | Age: 49
End: 2019-12-02

## 2019-12-02 DIAGNOSIS — M12.9 ARTHROPATHY, MULTIPLE SITES: ICD-10-CM

## 2019-12-06 ENCOUNTER — COMMUNICATION - HEALTHEAST (OUTPATIENT)
Dept: INTERNAL MEDICINE | Facility: CLINIC | Age: 49
End: 2019-12-06

## 2019-12-27 ENCOUNTER — COMMUNICATION - HEALTHEAST (OUTPATIENT)
Dept: INTERNAL MEDICINE | Facility: CLINIC | Age: 49
End: 2019-12-27

## 2019-12-27 DIAGNOSIS — M95.8 WINGED SCAPULA: ICD-10-CM

## 2020-01-03 ENCOUNTER — COMMUNICATION - HEALTHEAST (OUTPATIENT)
Dept: SCHEDULING | Facility: CLINIC | Age: 50
End: 2020-01-03

## 2020-01-03 ENCOUNTER — COMMUNICATION - HEALTHEAST (OUTPATIENT)
Dept: INTERNAL MEDICINE | Facility: CLINIC | Age: 50
End: 2020-01-03

## 2020-01-03 ENCOUNTER — COMMUNICATION - HEALTHEAST (OUTPATIENT)
Dept: ADMINISTRATIVE | Facility: CLINIC | Age: 50
End: 2020-01-03

## 2020-01-03 DIAGNOSIS — R20.8 DYSESTHESIA: ICD-10-CM

## 2020-01-03 DIAGNOSIS — M12.9 ARTHROPATHY, MULTIPLE SITES: ICD-10-CM

## 2020-01-26 ENCOUNTER — COMMUNICATION - HEALTHEAST (OUTPATIENT)
Dept: INTERNAL MEDICINE | Facility: CLINIC | Age: 50
End: 2020-01-26

## 2020-01-26 DIAGNOSIS — M95.8 WINGED SCAPULA: ICD-10-CM

## 2020-01-27 ENCOUNTER — COMMUNICATION - HEALTHEAST (OUTPATIENT)
Dept: INTERNAL MEDICINE | Facility: CLINIC | Age: 50
End: 2020-01-27

## 2020-01-27 DIAGNOSIS — M12.9 ARTHROPATHY, MULTIPLE SITES: ICD-10-CM

## 2020-02-16 ENCOUNTER — COMMUNICATION - HEALTHEAST (OUTPATIENT)
Dept: INTERNAL MEDICINE | Facility: CLINIC | Age: 50
End: 2020-02-16

## 2020-02-25 ENCOUNTER — COMMUNICATION - HEALTHEAST (OUTPATIENT)
Dept: INTERNAL MEDICINE | Facility: CLINIC | Age: 50
End: 2020-02-25

## 2020-02-25 DIAGNOSIS — M95.8 WINGED SCAPULA: ICD-10-CM

## 2020-02-25 DIAGNOSIS — R52 PAIN: ICD-10-CM

## 2020-03-06 ENCOUNTER — COMMUNICATION - HEALTHEAST (OUTPATIENT)
Dept: INTERNAL MEDICINE | Facility: CLINIC | Age: 50
End: 2020-03-06

## 2020-03-06 DIAGNOSIS — M12.9 ARTHROPATHY, MULTIPLE SITES: ICD-10-CM

## 2020-03-12 ENCOUNTER — RECORDS - HEALTHEAST (OUTPATIENT)
Dept: ADMINISTRATIVE | Facility: OTHER | Age: 50
End: 2020-03-12

## 2020-03-26 ENCOUNTER — COMMUNICATION - HEALTHEAST (OUTPATIENT)
Dept: INTERNAL MEDICINE | Facility: CLINIC | Age: 50
End: 2020-03-26

## 2020-03-26 DIAGNOSIS — M95.8 WINGED SCAPULA: ICD-10-CM

## 2020-03-27 ENCOUNTER — COMMUNICATION - HEALTHEAST (OUTPATIENT)
Dept: INTERNAL MEDICINE | Facility: CLINIC | Age: 50
End: 2020-03-27

## 2020-03-27 DIAGNOSIS — R20.8 DYSESTHESIA: ICD-10-CM

## 2020-03-27 DIAGNOSIS — M12.9 ARTHROPATHY, MULTIPLE SITES: ICD-10-CM

## 2020-04-07 ENCOUNTER — AMBULATORY - HEALTHEAST (OUTPATIENT)
Dept: INTERNAL MEDICINE | Facility: CLINIC | Age: 50
End: 2020-04-07

## 2020-04-07 ENCOUNTER — COMMUNICATION - HEALTHEAST (OUTPATIENT)
Dept: INTERNAL MEDICINE | Facility: CLINIC | Age: 50
End: 2020-04-07

## 2020-04-07 DIAGNOSIS — J32.9 SINUSITIS, UNSPECIFIED CHRONICITY, UNSPECIFIED LOCATION: ICD-10-CM

## 2020-04-25 ENCOUNTER — COMMUNICATION - HEALTHEAST (OUTPATIENT)
Dept: INTERNAL MEDICINE | Facility: CLINIC | Age: 50
End: 2020-04-25

## 2020-04-25 DIAGNOSIS — R68.2 DRY MOUTH: ICD-10-CM

## 2020-04-25 DIAGNOSIS — M95.8 WINGED SCAPULA: ICD-10-CM

## 2020-05-01 ENCOUNTER — COMMUNICATION - HEALTHEAST (OUTPATIENT)
Dept: INTERNAL MEDICINE | Facility: CLINIC | Age: 50
End: 2020-05-01

## 2020-05-01 DIAGNOSIS — M12.9 ARTHROPATHY, MULTIPLE SITES: ICD-10-CM

## 2020-05-25 ENCOUNTER — COMMUNICATION - HEALTHEAST (OUTPATIENT)
Dept: INTERNAL MEDICINE | Facility: CLINIC | Age: 50
End: 2020-05-25

## 2020-05-25 DIAGNOSIS — M95.8 WINGED SCAPULA: ICD-10-CM

## 2020-05-29 ENCOUNTER — COMMUNICATION - HEALTHEAST (OUTPATIENT)
Dept: INTERNAL MEDICINE | Facility: CLINIC | Age: 50
End: 2020-05-29

## 2020-05-29 DIAGNOSIS — M12.9 ARTHROPATHY, MULTIPLE SITES: ICD-10-CM

## 2020-06-24 ENCOUNTER — COMMUNICATION - HEALTHEAST (OUTPATIENT)
Dept: INTERNAL MEDICINE | Facility: CLINIC | Age: 50
End: 2020-06-24

## 2020-06-24 DIAGNOSIS — M95.8 WINGED SCAPULA: ICD-10-CM

## 2020-06-30 ENCOUNTER — COMMUNICATION - HEALTHEAST (OUTPATIENT)
Dept: INTERNAL MEDICINE | Facility: CLINIC | Age: 50
End: 2020-06-30

## 2020-06-30 DIAGNOSIS — M12.9 ARTHROPATHY, MULTIPLE SITES: ICD-10-CM

## 2020-06-30 DIAGNOSIS — M95.8 WINGED SCAPULA: ICD-10-CM

## 2020-06-30 DIAGNOSIS — R20.8 DYSESTHESIA: ICD-10-CM

## 2020-07-07 ENCOUNTER — COMMUNICATION - HEALTHEAST (OUTPATIENT)
Dept: INTERNAL MEDICINE | Facility: CLINIC | Age: 50
End: 2020-07-07

## 2020-07-07 DIAGNOSIS — K22.5 ZENKER'S DIVERTICULUM: ICD-10-CM

## 2020-07-07 RX ORDER — MECOBALAMIN 5000 MCG
15 TABLET,DISINTEGRATING ORAL EVERY OTHER DAY
Qty: 45 CAPSULE | Refills: 3 | Status: SHIPPED | OUTPATIENT
Start: 2020-07-07 | End: 2021-10-04

## 2020-07-24 ENCOUNTER — COMMUNICATION - HEALTHEAST (OUTPATIENT)
Dept: INTERNAL MEDICINE | Facility: CLINIC | Age: 50
End: 2020-07-24

## 2020-07-24 DIAGNOSIS — M95.8 WINGED SCAPULA: ICD-10-CM

## 2020-07-24 DIAGNOSIS — M12.9 ARTHROPATHY, MULTIPLE SITES: ICD-10-CM

## 2020-08-14 ENCOUNTER — COMMUNICATION - HEALTHEAST (OUTPATIENT)
Dept: CARE COORDINATION | Facility: CLINIC | Age: 50
End: 2020-08-14

## 2020-08-30 ENCOUNTER — COMMUNICATION - HEALTHEAST (OUTPATIENT)
Dept: INTERNAL MEDICINE | Facility: CLINIC | Age: 50
End: 2020-08-30

## 2020-08-30 DIAGNOSIS — M12.9 ARTHROPATHY, MULTIPLE SITES: ICD-10-CM

## 2020-08-30 DIAGNOSIS — M95.8 WINGED SCAPULA: ICD-10-CM

## 2020-09-08 ENCOUNTER — OFFICE VISIT - HEALTHEAST (OUTPATIENT)
Dept: INTERNAL MEDICINE | Facility: CLINIC | Age: 50
End: 2020-09-08

## 2020-09-08 DIAGNOSIS — M25.562 ARTHRALGIA OF BOTH KNEES: ICD-10-CM

## 2020-09-08 DIAGNOSIS — R20.8 DYSESTHESIA: ICD-10-CM

## 2020-09-08 DIAGNOSIS — M25.561 ARTHRALGIA OF BOTH KNEES: ICD-10-CM

## 2020-09-08 LAB
ALBUMIN SERPL-MCNC: 4.2 G/DL (ref 3.5–5)
ALP SERPL-CCNC: 67 U/L (ref 45–120)
ALT SERPL W P-5'-P-CCNC: 17 U/L (ref 0–45)
ANION GAP SERPL CALCULATED.3IONS-SCNC: 11 MMOL/L (ref 5–18)
AST SERPL W P-5'-P-CCNC: 18 U/L (ref 0–40)
BASOPHILS # BLD AUTO: 0.1 THOU/UL (ref 0–0.2)
BASOPHILS NFR BLD AUTO: 1 % (ref 0–2)
BILIRUB SERPL-MCNC: 0.3 MG/DL (ref 0–1)
BUN SERPL-MCNC: 29 MG/DL (ref 8–22)
C REACTIVE PROTEIN LHE: 0.2 MG/DL (ref 0–0.8)
CALCIUM SERPL-MCNC: 10 MG/DL (ref 8.5–10.5)
CHLORIDE BLD-SCNC: 103 MMOL/L (ref 98–107)
CO2 SERPL-SCNC: 26 MMOL/L (ref 22–31)
CREAT SERPL-MCNC: 1.08 MG/DL (ref 0.7–1.3)
EOSINOPHIL # BLD AUTO: 0.1 THOU/UL (ref 0–0.4)
EOSINOPHIL NFR BLD AUTO: 1 % (ref 0–6)
ERYTHROCYTE [DISTWIDTH] IN BLOOD BY AUTOMATED COUNT: 12.2 % (ref 11–14.5)
ERYTHROCYTE [SEDIMENTATION RATE] IN BLOOD BY WESTERGREN METHOD: 5 MM/HR (ref 0–15)
GFR SERPL CREATININE-BSD FRML MDRD: >60 ML/MIN/1.73M2
GLUCOSE BLD-MCNC: 81 MG/DL (ref 70–125)
HCT VFR BLD AUTO: 46.7 % (ref 40–54)
HGB BLD-MCNC: 15.4 G/DL (ref 14–18)
LIPASE SERPL-CCNC: 22 U/L (ref 0–52)
LYMPHOCYTES # BLD AUTO: 1.7 THOU/UL (ref 0.8–4.4)
LYMPHOCYTES NFR BLD AUTO: 22 % (ref 20–40)
MCH RBC QN AUTO: 29.5 PG (ref 27–34)
MCHC RBC AUTO-ENTMCNC: 33 G/DL (ref 32–36)
MCV RBC AUTO: 90 FL (ref 80–100)
MONOCYTES # BLD AUTO: 0.6 THOU/UL (ref 0–0.9)
MONOCYTES NFR BLD AUTO: 7 % (ref 2–10)
NEUTROPHILS # BLD AUTO: 5.5 THOU/UL (ref 2–7.7)
NEUTROPHILS NFR BLD AUTO: 69 % (ref 50–70)
PLATELET # BLD AUTO: 310 THOU/UL (ref 140–440)
PMV BLD AUTO: 8.2 FL (ref 7–10)
POTASSIUM BLD-SCNC: 4.6 MMOL/L (ref 3.5–5)
PROT SERPL-MCNC: 7.5 G/DL (ref 6–8)
RBC # BLD AUTO: 5.22 MILL/UL (ref 4.4–6.2)
SODIUM SERPL-SCNC: 140 MMOL/L (ref 136–145)
WBC: 8 THOU/UL (ref 4–11)

## 2020-09-09 LAB — B BURGDOR IGG+IGM SER QL: 0.08 INDEX VALUE

## 2020-09-22 ENCOUNTER — COMMUNICATION - HEALTHEAST (OUTPATIENT)
Dept: INTERNAL MEDICINE | Facility: CLINIC | Age: 50
End: 2020-09-22

## 2020-09-22 DIAGNOSIS — M95.8 WINGED SCAPULA: ICD-10-CM

## 2020-09-29 ENCOUNTER — COMMUNICATION - HEALTHEAST (OUTPATIENT)
Dept: INTERNAL MEDICINE | Facility: CLINIC | Age: 50
End: 2020-09-29

## 2020-09-29 DIAGNOSIS — R52 PAIN: ICD-10-CM

## 2020-09-29 DIAGNOSIS — R20.8 DYSESTHESIA: ICD-10-CM

## 2020-09-29 DIAGNOSIS — M95.8 WINGED SCAPULA: ICD-10-CM

## 2020-09-30 ENCOUNTER — COMMUNICATION - HEALTHEAST (OUTPATIENT)
Dept: INTERNAL MEDICINE | Facility: CLINIC | Age: 50
End: 2020-09-30

## 2020-09-30 DIAGNOSIS — M12.9 ARTHROPATHY, MULTIPLE SITES: ICD-10-CM

## 2020-11-01 ENCOUNTER — COMMUNICATION - HEALTHEAST (OUTPATIENT)
Dept: INTERNAL MEDICINE | Facility: CLINIC | Age: 50
End: 2020-11-01

## 2020-11-01 DIAGNOSIS — M12.9 ARTHROPATHY, MULTIPLE SITES: ICD-10-CM

## 2020-11-30 ENCOUNTER — COMMUNICATION - HEALTHEAST (OUTPATIENT)
Dept: SCHEDULING | Facility: CLINIC | Age: 50
End: 2020-11-30

## 2020-11-30 DIAGNOSIS — R20.8 DYSESTHESIA: ICD-10-CM

## 2020-11-30 DIAGNOSIS — M12.9 ARTHROPATHY, MULTIPLE SITES: ICD-10-CM

## 2020-12-01 ENCOUNTER — COMMUNICATION - HEALTHEAST (OUTPATIENT)
Dept: INTERNAL MEDICINE | Facility: CLINIC | Age: 50
End: 2020-12-01

## 2020-12-01 DIAGNOSIS — M95.8 WINGED SCAPULA: ICD-10-CM

## 2020-12-01 RX ORDER — GABAPENTIN 300 MG/1
CAPSULE ORAL
Qty: 90 CAPSULE | Refills: 3 | Status: SHIPPED | OUTPATIENT
Start: 2020-12-01 | End: 2022-01-05

## 2021-01-02 ENCOUNTER — COMMUNICATION - HEALTHEAST (OUTPATIENT)
Dept: INTERNAL MEDICINE | Facility: CLINIC | Age: 51
End: 2021-01-02

## 2021-01-02 DIAGNOSIS — R52 PAIN: ICD-10-CM

## 2021-01-06 ENCOUNTER — COMMUNICATION - HEALTHEAST (OUTPATIENT)
Dept: PEDIATRICS | Facility: CLINIC | Age: 51
End: 2021-01-06

## 2021-01-06 DIAGNOSIS — M12.9 ARTHROPATHY, MULTIPLE SITES: ICD-10-CM

## 2021-01-07 ENCOUNTER — COMMUNICATION - HEALTHEAST (OUTPATIENT)
Dept: INTERNAL MEDICINE | Facility: CLINIC | Age: 51
End: 2021-01-07

## 2021-02-01 ENCOUNTER — COMMUNICATION - HEALTHEAST (OUTPATIENT)
Dept: ADMINISTRATIVE | Facility: CLINIC | Age: 51
End: 2021-02-01

## 2021-02-03 ENCOUNTER — COMMUNICATION - HEALTHEAST (OUTPATIENT)
Dept: ADMINISTRATIVE | Facility: CLINIC | Age: 51
End: 2021-02-03

## 2021-02-03 DIAGNOSIS — M12.9 ARTHROPATHY, MULTIPLE SITES: ICD-10-CM

## 2021-02-03 DIAGNOSIS — M95.8 WINGED SCAPULA: ICD-10-CM

## 2021-02-26 ENCOUNTER — COMMUNICATION - HEALTHEAST (OUTPATIENT)
Dept: ADMINISTRATIVE | Facility: CLINIC | Age: 51
End: 2021-02-26

## 2021-02-26 DIAGNOSIS — M12.9 ARTHROPATHY, MULTIPLE SITES: ICD-10-CM

## 2021-02-26 DIAGNOSIS — R20.8 DYSESTHESIA: ICD-10-CM

## 2021-03-02 RX ORDER — CYCLOBENZAPRINE HCL 10 MG
10 TABLET ORAL 3 TIMES DAILY PRN
Qty: 90 TABLET | Refills: 3 | Status: SHIPPED | OUTPATIENT
Start: 2021-03-02 | End: 2022-01-05

## 2021-03-16 ENCOUNTER — COMMUNICATION - HEALTHEAST (OUTPATIENT)
Dept: SCHEDULING | Facility: CLINIC | Age: 51
End: 2021-03-16

## 2021-03-16 ENCOUNTER — OFFICE VISIT - HEALTHEAST (OUTPATIENT)
Dept: INTERNAL MEDICINE | Facility: CLINIC | Age: 51
End: 2021-03-16

## 2021-03-16 DIAGNOSIS — R21 RASH AND NONSPECIFIC SKIN ERUPTION: ICD-10-CM

## 2021-03-17 ENCOUNTER — COMMUNICATION - HEALTHEAST (OUTPATIENT)
Dept: ADMINISTRATIVE | Facility: CLINIC | Age: 51
End: 2021-03-17

## 2021-03-19 ENCOUNTER — OFFICE VISIT - HEALTHEAST (OUTPATIENT)
Dept: INTERNAL MEDICINE | Facility: CLINIC | Age: 51
End: 2021-03-19

## 2021-03-19 DIAGNOSIS — R21 RASH AND NONSPECIFIC SKIN ERUPTION: ICD-10-CM

## 2021-03-26 ENCOUNTER — COMMUNICATION - HEALTHEAST (OUTPATIENT)
Dept: SCHEDULING | Facility: CLINIC | Age: 51
End: 2021-03-26

## 2021-03-26 ENCOUNTER — COMMUNICATION - HEALTHEAST (OUTPATIENT)
Dept: FAMILY MEDICINE | Facility: CLINIC | Age: 51
End: 2021-03-26

## 2021-03-27 ENCOUNTER — COMMUNICATION - HEALTHEAST (OUTPATIENT)
Dept: SCHEDULING | Facility: CLINIC | Age: 51
End: 2021-03-27

## 2021-03-31 ENCOUNTER — COMMUNICATION - HEALTHEAST (OUTPATIENT)
Dept: NURSING | Facility: CLINIC | Age: 51
End: 2021-03-31

## 2021-03-31 ENCOUNTER — AMBULATORY - HEALTHEAST (OUTPATIENT)
Dept: CARE COORDINATION | Facility: CLINIC | Age: 51
End: 2021-03-31

## 2021-03-31 DIAGNOSIS — K85.90 PANCREATITIS, UNSPECIFIED PANCREATITIS TYPE: ICD-10-CM

## 2021-04-02 ENCOUNTER — COMMUNICATION - HEALTHEAST (OUTPATIENT)
Dept: INTERNAL MEDICINE | Facility: CLINIC | Age: 51
End: 2021-04-02

## 2021-04-02 DIAGNOSIS — M12.9 ARTHROPATHY, MULTIPLE SITES: ICD-10-CM

## 2021-04-06 ENCOUNTER — OFFICE VISIT - HEALTHEAST (OUTPATIENT)
Dept: INTERNAL MEDICINE | Facility: CLINIC | Age: 51
End: 2021-04-06

## 2021-04-06 DIAGNOSIS — K85.30 DRUG-INDUCED ACUTE PANCREATITIS, UNSPECIFIED COMPLICATION STATUS: ICD-10-CM

## 2021-04-06 LAB — LIPASE SERPL-CCNC: 75 U/L (ref 0–52)

## 2021-04-26 ENCOUNTER — COMMUNICATION - HEALTHEAST (OUTPATIENT)
Dept: INTERNAL MEDICINE | Facility: CLINIC | Age: 51
End: 2021-04-26

## 2021-04-26 DIAGNOSIS — R68.2 DRY MOUTH: ICD-10-CM

## 2021-05-05 ENCOUNTER — COMMUNICATION - HEALTHEAST (OUTPATIENT)
Dept: ADMINISTRATIVE | Facility: CLINIC | Age: 51
End: 2021-05-05

## 2021-05-05 DIAGNOSIS — M12.9 ARTHROPATHY, MULTIPLE SITES: ICD-10-CM

## 2021-05-05 DIAGNOSIS — R20.8 DYSESTHESIA: ICD-10-CM

## 2021-05-06 ENCOUNTER — COMMUNICATION - HEALTHEAST (OUTPATIENT)
Dept: FAMILY MEDICINE | Facility: CLINIC | Age: 51
End: 2021-05-06

## 2021-05-06 DIAGNOSIS — M12.9 ARTHROPATHY, MULTIPLE SITES: ICD-10-CM

## 2021-05-06 DIAGNOSIS — R20.8 DYSESTHESIA: ICD-10-CM

## 2021-05-06 RX ORDER — ALPRAZOLAM 1 MG
TABLET ORAL
Qty: 90 TABLET | Refills: 0 | Status: SHIPPED | OUTPATIENT
Start: 2021-05-06 | End: 2021-11-01

## 2021-05-06 RX ORDER — TRAMADOL HYDROCHLORIDE 50 MG/1
50 TABLET ORAL DAILY
Qty: 30 TABLET | Refills: 0 | Status: SHIPPED | OUTPATIENT
Start: 2021-05-06 | End: 2021-11-15

## 2021-05-10 ENCOUNTER — OFFICE VISIT - HEALTHEAST (OUTPATIENT)
Dept: INTERNAL MEDICINE | Facility: CLINIC | Age: 51
End: 2021-05-10

## 2021-05-10 DIAGNOSIS — K21.9 GASTROESOPHAGEAL REFLUX DISEASE WITHOUT ESOPHAGITIS: ICD-10-CM

## 2021-05-10 RX ORDER — NAPROXEN 500 MG/1
500 TABLET ORAL AT BEDTIME
Status: SHIPPED | COMMUNITY
Start: 2021-05-04 | End: 2022-01-24

## 2021-05-10 RX ORDER — PANTOPRAZOLE SODIUM 20 MG/1
20 TABLET, DELAYED RELEASE ORAL DAILY
Qty: 90 TABLET | Refills: 1 | Status: SHIPPED | OUTPATIENT
Start: 2021-05-10 | End: 2021-10-11

## 2021-05-25 ENCOUNTER — COMMUNICATION - HEALTHEAST (OUTPATIENT)
Dept: INTERNAL MEDICINE | Facility: CLINIC | Age: 51
End: 2021-05-25

## 2021-05-26 VITALS — DIASTOLIC BLOOD PRESSURE: 78 MMHG | HEART RATE: 76 BPM | SYSTOLIC BLOOD PRESSURE: 108 MMHG

## 2021-05-27 VITALS — DIASTOLIC BLOOD PRESSURE: 80 MMHG | SYSTOLIC BLOOD PRESSURE: 132 MMHG | HEART RATE: 76 BPM

## 2021-05-27 VITALS — DIASTOLIC BLOOD PRESSURE: 78 MMHG | HEART RATE: 108 BPM | SYSTOLIC BLOOD PRESSURE: 118 MMHG

## 2021-05-27 VITALS — HEART RATE: 87 BPM | SYSTOLIC BLOOD PRESSURE: 106 MMHG | OXYGEN SATURATION: 97 % | DIASTOLIC BLOOD PRESSURE: 72 MMHG

## 2021-05-27 VITALS — DIASTOLIC BLOOD PRESSURE: 82 MMHG | SYSTOLIC BLOOD PRESSURE: 129 MMHG | HEART RATE: 76 BPM

## 2021-05-27 NOTE — TELEPHONE ENCOUNTER
Controlled Substance Refill Request  Medication:   Requested Prescriptions     Pending Prescriptions Disp Refills     traMADol (ULTRAM) 50 mg tablet [Pharmacy Med Name: TRAMADOL 50MG TABLETS] 30 tablet 0     Sig: TAKE 1 TABLET(50 MG) BY MOUTH DAILY     traMADol (ULTRAM) 50 mg tablet [Pharmacy Med Name: TRAMADOL 50MG TABLETS] 30 tablet 0     Sig: TAKE 1 TABLET(50 MG) BY MOUTH DAILY     Date Last Fill: 2/21/19  Pharmacy: [id number]   Submit electronically to pharmacy  Controlled Substance Agreement on File:   Encounter-Level CSA Scan Date:    There are no encounter-level csa scan date.       Last office visit: Last office visit pertaining to requested medication was 2/21/19.

## 2021-05-28 ENCOUNTER — RECORDS - HEALTHEAST (OUTPATIENT)
Dept: ADMINISTRATIVE | Facility: CLINIC | Age: 51
End: 2021-05-28

## 2021-05-28 NOTE — TELEPHONE ENCOUNTER
Refill Approved    Rx renewed per Medication Renewal Policy. Medication was last renewed on 6/4/18.    Patti Villarreal, [facility] Connection Triage/Med Refill 4/23/2019     Requested Prescriptions   Pending Prescriptions Disp Refills     fluticasone propionate (FLONASE) 50 mcg/actuation nasal spray [Pharmacy Med Name: FLUTICASONE 50MCG NASAL SP (120) RX]  0     Sig: SHAKE LIQUID AND USE 1 SPRAY IN EACH NOSTRIL TWICE DAILY       Nasal Steroid Refill Protocol Passed - 4/21/2019  9:09 PM        Passed - Patient has had office visit/physical in last 2 years     Last office visit with prescriber/PCP: 10/3/2018 OR same dept: 2/21/2019 Lan Murry MD OR same specialty: 2/21/2019 Lan Murry MD Last physical: Visit date not found Last MTM visit: Visit date not found    Next appt within 3 mo: Visit date not found  Next physical within 3 mo: Visit date not found  Prescriber OR PCP: Naima Murry MD  Last diagnosis associated with med order: 1. Dry mouth  - fluticasone propionate (FLONASE) 50 mcg/actuation nasal spray [Pharmacy Med Name: FLUTICASONE 50MCG NASAL SP (120) RX]; SHAKE LIQUID AND USE 1 SPRAY IN EACH NOSTRIL TWICE DAILY; Refill: 0     If protocol passes may refill for 12 months if within 3 months of last provider visit (or a total of 15 months).

## 2021-05-28 NOTE — TELEPHONE ENCOUNTER
Controlled Substance Refill Request  Medication:   Requested Prescriptions     Pending Prescriptions Disp Refills     traMADol (ULTRAM) 50 mg tablet [Pharmacy Med Name: TRAMADOL 50MG TABLETS] 30 tablet 0     Sig: TAKE 1 TABLET(50 MG) BY MOUTH DAILY     Date Last Fill: 4/1/19  #30 R-0  Pharmacy: [id number]   Submit electronically to pharmacy  Controlled Substance Agreement on File:   Encounter-Level CSA Scan Date:    There are no encounter-level csa scan date.       Last office visit: 2/21/2019 Lan Murry MD Katie Beck RN Triage Nurse Advisor [facility]

## 2021-05-29 NOTE — TELEPHONE ENCOUNTER
Who is calling:  Patient  Reason for Call:  He is calling this morning to check on the below refill request. He states these were suppose to be sent in on 6/07/2019 while he was in clinic, but they were not. He is asking for his request to be expedited.   Date of last appointment with primary care: 6/07/2019  Okay to leave a detailed message: Yes

## 2021-05-29 NOTE — PROGRESS NOTES
ASSESSMENT and PLAN:    1) dysesthesia of a small area in the left shoulder.  This is not significantly interfering with Layo's day-to-day activities or otherwise causing him problems.  I advised a course of watchful waiting which she is fine with.  Follow-up with us as needed.    2.  Microscopic colitis, stable.    Problem List Items Addressed This Visit     Collagenous colitis - Primary      Other Visit Diagnoses     Arthropathy Of Multiple Sites              There are no Patient Instructions on file for this visit.    Medications Discontinued During This Encounter   Medication Reason     cyclobenzaprine (FLEXERIL) 10 MG tablet      tamsulosin (FLOMAX) 0.4 mg cap      DULoxetine (CYMBALTA) 20 MG capsule      FLUoxetine (PROZAC) 20 MG capsule      prazosin (MINIPRESS) 1 MG capsule        No follow-ups on file.    CHIEF COMPLAINT:  Chief Complaint   Patient presents with     Numbness     both arms from shoulder to eblow - more frequent and concerning      Other     red knees       HISTORY OF PRESENT ILLNESS:  Kwasi Silverman is a 49 y.o. male  presenting to the clinic today for evaluation of left arm dysesthesia.  He states that this is been going on for a month and localizes it to a very small, specific location approximately 2 cm in diameter on the lateral aspect of the shoulder.  It is not significantly impairing his day-to-day activities nor is it painful.  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  His collagenous colitis is stable.  Otherwise feeling well.    REVIEW OF SYSTEMS:   Pertinent positives noted in HPI, remainder of ROS is negative.    PFSH:      MEDICATIONS:  Current Outpatient Medications   Medication Sig Dispense Refill     budesonide (ENTOCORT EC) 3 mg 24 hr capsule TK 3 CS PO QAM FOR UP TO 8 WEEKS  3     cyclobenzaprine (FLEXERIL) 10 MG tablet TAKE 1 TABLET(10 MG) BY MOUTH THREE TIMES  DAILY AS NEEDED FOR MUSCLE SPASMS 90 tablet 3     fluticasone propionate (FLONASE) 50 mcg/actuation nasal spray SHAKE LIQUID AND USE 1 SPRAY IN EACH NOSTRIL TWICE DAILY 48 g 3     gabapentin (NEURONTIN) 300 MG capsule TAKE 1 CAPSULE(300 MG) BY MOUTH DAILY 90 capsule 3     lansoprazole (PREVACID) 15 MG capsule Take 1 capsule (15 mg total) by mouth every morning. 90 capsule 3     lidocaine (XYLOCAINE) 5 % ointment Apply topical to affected areas twice daily as needed for pain. 35.44 g 3     naproxen (NAPROSYN) 500 MG tablet TAKE 1 TABLET(500 MG) BY MOUTH TWICE DAILY AS NEEDED 180 tablet 3     SUMAtriptan (IMITREX) 50 MG tablet Take 1 tablet (50 mg total) by mouth as needed for migraine (may repeat every 2 hours.  max 200MG/day). 10 tablet 11     ALPRAZolam (XANAX) 1 MG tablet TAKE 1 TABLET(1 MG) BY MOUTH DAILY 90 tablet 0     traMADol (ULTRAM) 50 mg tablet TAKE 1 TABLET(50 MG) BY MOUTH DAILY 30 tablet 0     zolpidem (AMBIEN) 10 mg tablet TAKE 1/2 TO 1 TABLET BY MOUTH EVERY NIGHT AT BEDTIME AS NEEDED FOR SLEEP 20 tablet 0     No current facility-administered medications for this visit.        TOBACCO USE:  Social History     Tobacco Use   Smoking Status Never Smoker   Smokeless Tobacco Never Used       VITALS:  Vitals:    06/07/19 1529   BP: 102/70   Pulse: 92   Weight: 164 lb (74.4 kg)     Wt Readings from Last 3 Encounters:   06/07/19 164 lb (74.4 kg)   02/21/19 181 lb (82.1 kg)   02/05/19 182 lb (82.6 kg)         PHYSICAL EXAM:  Constitutional:  Reveals an alert, pleasant  male.   Vitals:  Per nursing notes.   Body mass index is 24.22 kg/m .

## 2021-05-29 NOTE — TELEPHONE ENCOUNTER
Controlled Substance Refill Request  Medication:   Requested Prescriptions     Pending Prescriptions Disp Refills     ALPRAZolam (XANAX) 1 MG tablet [Pharmacy Med Name: ALPRAZOLAM 1MG TABLETS] 90 tablet 0     Sig: TAKE 1 TABLET(1 MG) BY MOUTH DAILY     traMADol (ULTRAM) 50 mg tablet [Pharmacy Med Name: TRAMADOL 50MG TABLETS] 30 tablet 0     Sig: TAKE 1 TABLET(50 MG) BY MOUTH DAILY     zolpidem (AMBIEN) 10 mg tablet [Pharmacy Med Name: ZOLPIDEM 10MG TABLETS] 20 tablet 0     Sig: TAKE 1/2 TO 1 TABLET BY MOUTH EVERY NIGHT AT BEDTIME AS NEEDED FOR SLEEP     Date Last Fill: 2/21/19  Pharmacy: [facility]    Submit electronically to pharmacy    Controlled Substance Agreement on File:   Encounter-Level CSA Scan Date:    There are no encounter-level csa scan date.         Last office visit: 2/21/2019 Lan Murry MD

## 2021-05-30 VITALS — BODY MASS INDEX: 26.96 KG/M2 | HEIGHT: 69 IN | WEIGHT: 182 LBS

## 2021-05-30 NOTE — TELEPHONE ENCOUNTER
Who is calling:  Patient   Reason for Call:  Patient states that he needs to complete controled substance form , when he needs come , does he needs to be with the provider to fill the form . Patient is going on vacation on Saturday he wants to complete before . Please call the patient and inform when he needs to come.  Date of last appointment with primary care: 6/7/19  Okay to leave a detailed message: No

## 2021-05-30 NOTE — PROGRESS NOTES
"ASSESSMENT and PLAN:    #1.  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  His tramadol was refilled.  CSA was updated.  Follow-up as needed.    Problem List Items Addressed This Visit     Insomnia    Pain medication agreement - Primary          There are no Patient Instructions on file for this visit.    Medications Discontinued During This Encounter   Medication Reason     traMADol (ULTRAM) 50 mg tablet      zolpidem (AMBIEN) 10 mg tablet      ALPRAZolam (XANAX) 1 MG tablet      zolpidem (AMBIEN) 10 mg tablet        No follow-ups on file.    CHIEF COMPLAINT:  Chief Complaint   Patient presents with     Medication Management     go over meds - questions about usage with opioids       HISTORY OF PRESENT ILLNESS:  Kwasi Silverman is a 49 y.o. male  presenting to the clinic today for essentially renewing his CSA.  He is on 50 mg of tramadol per day for chronic shoulder pain and is also on 1 mg/day of Xanax for his anxiety.  He has been on this regimen for a number of years and has been tolerating it well.  He asked whether he was \"addicted\" to these medications I told him that even though his body was dependent on the medication, because he does not misuse or overuse his medication and it is not negatively interfering with his life he is not by definition addicted to the medication.  He was reassured by this.  He is otherwise feeling well.    REVIEW OF SYSTEMS:   Pertinent positives noted in HPI, remainder of ROS is negative.    PFSH:      MEDICATIONS:  Current Outpatient Medications   Medication Sig Dispense Refill     ALPRAZolam (XANAX) 1 MG tablet Take 1 tablet (1 mg total) by mouth daily. 90 tablet 0     budesonide (ENTOCORT EC) 3 mg 24 hr capsule TK 3 CS PO QAM FOR UP TO 8 WEEKS 48 capsule 0     cyclobenzaprine (FLEXERIL) 10 MG tablet TAKE 1 TABLET(10 MG) BY MOUTH THREE TIMES DAILY AS NEEDED FOR MUSCLE SPASMS 90 tablet 0     fluticasone propionate (FLONASE) 50 mcg/actuation nasal spray " SHAKE LIQUID AND USE 1 SPRAY IN EACH NOSTRIL TWICE DAILY 48 g 3     gabapentin (NEURONTIN) 300 MG capsule TAKE 1 CAPSULE(300 MG) BY MOUTH DAILY 90 capsule 3     lansoprazole (PREVACID) 15 MG capsule Take 1 capsule (15 mg total) by mouth every morning. 90 capsule 3     lidocaine (XYLOCAINE) 5 % ointment Apply topical to affected areas twice daily as needed for pain. 35.44 g 3     naproxen (NAPROSYN) 500 MG tablet TAKE 1 TABLET(500 MG) BY MOUTH TWICE DAILY AS NEEDED 180 tablet 3     SUMAtriptan (IMITREX) 50 MG tablet Take 1 tablet (50 mg total) by mouth as needed for migraine (may repeat every 2 hours.  max 200MG/day). 10 tablet 11     traMADol (ULTRAM) 50 mg tablet Take 1 tablet (50 mg total) by mouth daily. 30 tablet 0     No current facility-administered medications for this visit.        TOBACCO USE:  Social History     Tobacco Use   Smoking Status Never Smoker   Smokeless Tobacco Never Used       VITALS:  Vitals:    07/12/19 0840   BP: 110/68   Pulse: 72     Wt Readings from Last 3 Encounters:   06/07/19 164 lb (74.4 kg)   02/21/19 181 lb (82.1 kg)   02/05/19 182 lb (82.6 kg)         PHYSICAL EXAM:  Constitutional:  Reveals an alert, pleasant  male.   Vitals:  Per nursing notes.   There is no height or weight on file to calculate BMI.    Neurologic: Normal

## 2021-05-30 NOTE — TELEPHONE ENCOUNTER
RN cannot approve Refill Request    RN can NOT refill this medication med is not covered by policy/route to provider. Last office visit: 6/7/2019 Lan Murry MD Last Physical: Visit date not found Last MTM visit: Visit date not found Last visit same specialty: 6/7/2019 Lan Murry MD.  Next visit within 3 mo: Visit date not found  Next physical within 3 mo: Visit date not found      Judith Moore, [facility] Triage/Med Refill 7/5/2019    Requested Prescriptions   Pending Prescriptions Disp Refills     cyclobenzaprine (FLEXERIL) 10 MG tablet [Pharmacy Med Name: CYCLOBENZAPRINE 10MG TABLETS] 90 tablet 0     Sig: TAKE 1 TABLET(10 MG) BY MOUTH THREE TIMES DAILY AS NEEDED FOR MUSCLE SPASMS       There is no refill protocol information for this order

## 2021-05-30 NOTE — TELEPHONE ENCOUNTER
Patient scheduled to see Dr. Murry 7/12/19 regarding controlled substance agreement.  Michelle Sommer CMA ............... 3:12 PM, 07/10/19

## 2021-05-31 NOTE — TELEPHONE ENCOUNTER
Controlled Substance Refill Request  Medication:   Requested Prescriptions     Pending Prescriptions Disp Refills     traMADol (ULTRAM) 50 mg tablet [Pharmacy Med Name: TRAMADOL 50MG TABLETS] 30 tablet 0     Sig: TAKE 1 TABLET(50 MG) BY MOUTH DAILY     Date Last Fill: 6/13/19  Pharmacy: [email or web address] [id number]   Submit electronically to pharmacy  Controlled Substance Agreement on File:   Encounter-Level CSA Scan Date:    There are no encounter-level csa scan date.       Last office visit: Last office visit pertaining to requested medication was 7/12/19.

## 2021-05-31 NOTE — PROGRESS NOTES
ASSESSMENT and PLAN:    #1.  Bilateral AC joint arthropathy.  He desires repeat corticosteroid injections today.  The left AC joint was prepped in the usual fashion, and a combination of 1/2 cc 1% lidocaine 1/2 cc of Kenalog were injected into the joint in an inferolateral fashion.  Patient tolerated the procedure well and had no immediate complications.  The exact same procedure was performed on the right.  Follow-up as needed.    2.  Tinea versicolor.  A prescription for ketoconazole cream was called in.    3.  Left eustachian tube dysfunction.  He is going to add Allegra-D to his regimen of fluticasone.  He will call me if he is not significantly improved.    Problem List Items Addressed This Visit     Winged scapula - Primary    Relevant Orders    Ambulatory referral to Orthopedics      Other Visit Diagnoses     Arthropathy Of Multiple Sites        Relevant Medications    traMADol (ULTRAM) 50 mg tablet    Dysesthesia        Relevant Medications    ALPRAZolam (XANAX) 1 MG tablet    Tinea versicolor        Relevant Medications    ketoconazole (NIZORAL) 2 % cream    AC joint arthropathy        Relevant Medications    triamcinolone acetonide 40 mg/mL injection 20 mg (KENALOG-40) (Start on 8/22/2019  3:00 PM)    triamcinolone acetonide 40 mg/mL injection 20 mg (KENALOG-40) (Start on 8/22/2019  3:00 PM)          Patient Instructions   1) Add Allegra D to Flonase for next 7 days    2) Ketoconazole cream to the affected areas of the skin    3) We performed injections of both shoulders today    4) Follow up as needed      Medications Discontinued During This Encounter   Medication Reason     traMADol (ULTRAM) 50 mg tablet Reorder     ALPRAZolam (XANAX) 1 MG tablet Reorder     budesonide (ENTOCORT EC) 3 mg 24 hr capsule        No follow-ups on file.    CHIEF COMPLAINT:  Chief Complaint   Patient presents with     Shoulder Pain     needs approval for shot's at [facility] (referral to specific doc)     Dizziness     ear pain,  "ringing - pt sofiya in ear drops to see what the drops are for      Skin Discoloration     started this year        HISTORY OF PRESENT ILLNESS:  Kwasi Silverman is a 49 y.o. male  presenting to the clinic today for evaluation of a few different issues.    1.  He has persistent anterior shoulder pain which is been going on for the last few months.  He has had this treated at the [facility] and has had corticosteroid injections in the AC joints which helped significantly.  He has been diagnosed with AC joint arthritis there apparently.  He is wondering if he can do these again today.    2.  He has had skin depigmentation for about the last couple of weeks.  He localizes it to the left arm, trunk, inner thighs, back, and left leg.  It is not pruritic or painful at all.  No fevers.    3.  He states that he feels that his left ear is plugged up.  He feels like there is water in the ear and it \"crackles\" at times.  He is wondering if he has cerumen impaction.    REVIEW OF SYSTEMS:   Pertinent positives noted in HPI, remainder of ROS is negative.    MEDICATIONS:  Current Outpatient Medications   Medication Sig Dispense Refill     ALPRAZolam (XANAX) 1 MG tablet Take 1 tablet (1 mg total) by mouth daily. 90 tablet 0     cyclobenzaprine (FLEXERIL) 10 MG tablet TAKE 1 TABLET(10 MG) BY MOUTH THREE TIMES DAILY AS NEEDED FOR MUSCLE SPASMS 90 tablet 0     fluticasone propionate (FLONASE) 50 mcg/actuation nasal spray SHAKE LIQUID AND USE 1 SPRAY IN EACH NOSTRIL TWICE DAILY 48 g 3     gabapentin (NEURONTIN) 300 MG capsule TAKE 1 CAPSULE(300 MG) BY MOUTH DAILY 90 capsule 3     lansoprazole (PREVACID) 15 MG capsule Take 1 capsule (15 mg total) by mouth every morning. 90 capsule 3     lidocaine (XYLOCAINE) 5 % ointment Apply topical to affected areas twice daily as needed for pain. 35.44 g 3     naproxen (NAPROSYN) 500 MG tablet TAKE 1 TABLET(500 MG) BY MOUTH TWICE DAILY AS NEEDED 180 tablet 3     SUMAtriptan (IMITREX) 50 MG tablet Take " 1 tablet (50 mg total) by mouth as needed for migraine (may repeat every 2 hours.  max 200MG/day). 10 tablet 11     traMADol (ULTRAM) 50 mg tablet Take 1 tablet (50 mg total) by mouth daily. 30 tablet 0     ketoconazole (NIZORAL) 2 % cream Apply once daily to affected areas for two weeks 60 g 0     Current Facility-Administered Medications   Medication Dose Route Frequency Provider Last Rate Last Dose     triamcinolone acetonide 40 mg/mL injection 20 mg (KENALOG-40)  20 mg Other Once Lan Murry MD         triamcinolone acetonide 40 mg/mL injection 20 mg (KENALOG-40)  20 mg Other Once Lan Murry MD           TOBACCO USE:  Social History     Tobacco Use   Smoking Status Never Smoker   Smokeless Tobacco Never Used       VITALS:  Vitals:    08/22/19 1416   BP: 118/80   Pulse: 64     Wt Readings from Last 3 Encounters:   06/07/19 164 lb (74.4 kg)   02/21/19 181 lb (82.1 kg)   02/05/19 182 lb (82.6 kg)         PHYSICAL EXAM:  Constitutional:  Reveals an alert, pleasant male.   HEET: Normocephalic, without obvious abnormality, atraumatic. PERRL, conjunctiva/corneas clear, EOM's intact. External canals, TM retracted on the left, normal on the right.  Neurologic: Normal gait and station  Psychologic: Normal affect  Skin: There are patches of well demarcated hypopigmentation on the skin consistent with tinea versicolor.    Musculoskeletal: Tender to palpation over both AC joints which is where he localizes his pain.

## 2021-05-31 NOTE — TELEPHONE ENCOUNTER
Received MTM referral from patient's insurance ([employer])     Attempt: 2, 8/14/19  Result: no answer    Thank you for the referral,    Dayana Blanco MTM Coordinator Intern         Received MTM referral from patient's insurance ()     Attempt: 3, 8/14/19  Result: no answer, not valid number    Thank you for the referral,    Dayana Blanco MTM Coordinator Intern

## 2021-05-31 NOTE — PATIENT INSTRUCTIONS - HE
1) Add Allegra D to Flonase for next 7 days    2) Ketoconazole cream to the affected areas of the skin    3) We performed injections of both shoulders today    4) Follow up as needed

## 2021-06-01 VITALS — BODY MASS INDEX: 29.24 KG/M2 | WEIGHT: 198 LBS

## 2021-06-01 VITALS — BODY MASS INDEX: 27.32 KG/M2 | WEIGHT: 185 LBS

## 2021-06-01 VITALS — BODY MASS INDEX: 28.65 KG/M2 | WEIGHT: 194 LBS

## 2021-06-01 VITALS — WEIGHT: 194 LBS | BODY MASS INDEX: 28.73 KG/M2 | HEIGHT: 69 IN

## 2021-06-01 NOTE — TELEPHONE ENCOUNTER
Called patient per Dr. Murry to cancel appointment for tomorrow regarding continued skin issues. Cream has made rash worse. Patient would like to see dermatology.  Order pended.

## 2021-06-01 NOTE — TELEPHONE ENCOUNTER
RN cannot approve Refill Request    RN can NOT refill this medication med is not covered by policy/route to provider. Last office visit: 8/22/2019 Lan Murry MD Last Physical: Visit date not found Last MTM visit: Visit date not found Last visit same specialty: 8/22/2019 Lan Murry MD.  Next visit within 3 mo: Visit date not found  Next physical within 3 mo: Visit date not found      Nella Mendoza, [facility] Triage/Med Refill 9/28/2019    Requested Prescriptions   Pending Prescriptions Disp Refills     cyclobenzaprine (FLEXERIL) 10 MG tablet [Pharmacy Med Name: CYCLOBENZAPRINE 10MG TABLETS] 90 tablet 0     Sig: TAKE 1 TABLET(10 MG) BY MOUTH THREE TIMES DAILY AS NEEDED FOR MUSCLE SPASMS       There is no refill protocol information for this order

## 2021-06-01 NOTE — TELEPHONE ENCOUNTER
Controlled Substance Refill Request  Medication:   Requested Prescriptions     Pending Prescriptions Disp Refills     traMADol (ULTRAM) 50 mg tablet [Pharmacy Med Name: TRAMADOL 50MG TABLETS] 30 tablet 0     Sig: TAKE 1 TABLET(50 MG) BY MOUTH DAILY     Date Last Fill: 8/22/19  Pharmacy: [id number]   Submit electronically to pharmacy  Controlled Substance Agreement on File:   Encounter-Level CSA Scan Date:    There are no encounter-level csa scan date.       Last office visit: Last office visit pertaining to requested medication was 8/22/19.

## 2021-06-02 ENCOUNTER — RECORDS - HEALTHEAST (OUTPATIENT)
Dept: ADMINISTRATIVE | Facility: CLINIC | Age: 51
End: 2021-06-02

## 2021-06-02 VITALS — BODY MASS INDEX: 24.22 KG/M2 | WEIGHT: 164 LBS

## 2021-06-02 VITALS — BODY MASS INDEX: 26.73 KG/M2 | WEIGHT: 181 LBS

## 2021-06-02 VITALS — BODY MASS INDEX: 27.53 KG/M2 | WEIGHT: 186.4 LBS

## 2021-06-02 VITALS — BODY MASS INDEX: 26.88 KG/M2 | WEIGHT: 182 LBS

## 2021-06-02 NOTE — TELEPHONE ENCOUNTER
RN cannot approve Refill Request    RN can NOT refill this medication med is not covered by policy/route to provider     . Last office visit: 10/18/2019 Lan Murry MD Last Physical: Visit date not found Last MTM visit: Visit date not found Last visit same specialty: 10/18/2019 Lan Murry MD.  Next visit within 3 mo: Visit date not found  Next physical within 3 mo: Visit date not found      Patti Villarreal, [facility] Triage/Med Refill 10/28/2019    Requested Prescriptions   Pending Prescriptions Disp Refills     cyclobenzaprine (FLEXERIL) 10 MG tablet [Pharmacy Med Name: CYCLOBENZAPRINE 10MG TABLETS] 90 tablet 0     Sig: TAKE 1 TABLET(10 MG) BY MOUTH THREE TIMES DAILY AS NEEDED FOR MUSCLE SPASMS       There is no refill protocol information for this order

## 2021-06-02 NOTE — PATIENT INSTRUCTIONS - HE
1. Flareup of bilateral SI joint pain, started about 10 days ago, spontaneously.  He has a history of SI joint pain with imaging findings of SI joint degeneration.  In August and September 2018, he underwent CT-guided SI joint injection on the left and then the right side.  He reported excellent relief from that.    He told me that he has been very consistent with daily home exercise and stretching as he learned in physical therapy.  I reminded him of the importance of the exercises, which is actually more than injections important for long-term symptom control.    However, now he is experiencing a flareup of SI joint pain.  It hurts to sit.  On examination, he has positive Pankaj test on the left and the right.  He has normal muscle strength in his lower extremities.  No loss of sensation, no bowel or bladder dysfunction.    I think is reasonable for him to get another round of SI joint injection on the left and on the right.  We will start by getting plain films today, AP hip pictures.    I will place the order for him to have SI joint injection in interventional radiology, which he had done here at [facility].    Today, we will also get a basic metabolic panel, since he may need to get radiocontrast.    He is on medication for muscular skeletal pain including tramadol which he has been taking every other day, naproxen, topical lidocaine, and gabapentin 300 mg once a day.  Also cyclobenzaprine for muscle spasms I told him he can continue with these.    2.  The bilateral acromioclavicular joint arthropathy, for which he had injection done by Dr. Lan Murry just this past August.    3.  Right scapular winging, status post tendon transplant procedure done at [facility].    4.  Microscopic collagenous colitis, currently quiescent.    5.  History of bulimia, in remission, he had outpatient follow-up at [facility] in July, and everything was stable.    6.  Migraine headaches, takes Imitrex for that.  He gets headaches  approximately migraine headaches, occur 3-4 times a month.    He has a follow-up appointment with Dr. Lan Murry this coming Friday, October 18, I asked Mr. Silverman to keep it, so that Dr. Murry can confirm the treatment plan.

## 2021-06-02 NOTE — TELEPHONE ENCOUNTER
Controlled Substance Refill Request  Medication:   Requested Prescriptions     Pending Prescriptions Disp Refills     traMADol (ULTRAM) 50 mg tablet [Pharmacy Med Name: TRAMADOL 50MG TABLETS] 30 tablet 0     Sig: TAKE 1 TABLET(50 MG) BY MOUTH DAILY     Date Last Fill: 10/2/19  Pharmacy: [id number]   Submit electronically to pharmacy  Controlled Substance Agreement on File:   Encounter-Level CSA Scan Date:    There are no encounter-level csa scan date.       Last office visit: 10/18/2019 Lan Murry MD Jill Thielen, RN  Triage Nurse Advisor

## 2021-06-02 NOTE — PROGRESS NOTES
Office Visit - Follow Up   Kwasi Silverman   49 y.o. male    Date of Visit: 10/15/2019    Chief Complaint   Patient presents with     Injections     would like orders for back injections. SI joint-injections done at hospital         Assessment and Plan     1.  Flareup of bilateral SI joint pain, started about 10 days ago, spontaneously.  He has a history of SI joint pain with imaging findings of SI joint degeneration.  In August and September 2018, he underwent CT-guided SI joint injection on the left and then the right side.  He reported excellent relief from that.    He told me that he has been very consistent with daily home exercise and stretching as he learned in physical therapy.  I reminded him of the importance of the exercises, which is actually more than injections important for long-term symptom control.    However, now he is experiencing a flareup of SI joint pain.  It hurts to sit.  On examination, he has positive Pankaj test on the left and the right.  He has normal muscle strength in his lower extremities.  No loss of sensation, no bowel or bladder dysfunction.    I think is reasonable for him to get another round of SI joint injection on the left and on the right.  We will start by getting plain films today, AP hip pictures.    I will place the order for him to have SI joint injection in interventional radiology, which he had done here at [facility].    Today, we will also get a basic metabolic panel, since he may need to get radiocontrast.    He is on medication for muscular skeletal pain including tramadol which he has been taking every other day, naproxen, topical lidocaine, and gabapentin 300 mg once a day.  Also cyclobenzaprine for muscle spasms I told him he can continue with these.    2.  The bilateral acromioclavicular joint arthropathy, for which he had injection done by Dr. Lan Murry just this past August.    3.  Right scapular winging, status post tendon transplant procedure done at  [facility].    4.  Microscopic collagenous colitis, currently quiescent.    5.  History of bulimia, in remission, he had outpatient follow-up at [facility] in July, and everything was stable.    6.  Migraine headaches, takes Imitrex for that.  He gets headaches approximately migraine headaches, occur 3-4 times a month.    He has a follow-up appointment with Dr. Lan Murry this coming Friday, October 18, I asked Mr. Silverman to keep it, so that Dr. Murry can confirm the treatment plan.       History of Present Illness   This 49 y.o. old having a flareup of previously experienced SI joint pain, bilateral, going on about 2 weeks now.    He had this problem back in 2018, and underwent left followed by right-sided injection in August and September 2018.    He has been through physical therapy, and he insists that he is been very consistent with the home exercise program.  He works as a writer, and sits for rather long periods      8/8/2018 11:08 AM   INDICATION: Sacrococcygeal disorders, not elsewhere classified. Pain.  TECHNIQUE: CT-guided left SI joint injection. Dose reduction techniques utilized.   PROCEDURE: Informed consent obtained. Time out performed. The site was prepped and draped in sterile fashion. 5 mL of 1% lidocaine was infused into the local soft tissues. Using standard technique and under direct CT guidance, a 22-gauge spinal needle   was inserted into the left SI joint. Steroid solution was injected (3 ml total).  INJECTION:  1 mL of 40 mg per mL of Kenalog-40.    2 mL of 0.5% bupivacaine 5 mg/mL .     1. RIGHT SI JOINT INJECTION  2. CT GUIDANCE  9/12/2018 10:46 AM  PROCEDURE: Procedure and risks explained and consent received. The skin was prepped and draped in sterile fashion. 5 mL 1% lidocaine infused in local soft tissues.  Under direct fluoroscopic guidance, a 22 gauge spinal needle was introduced into the joint space. Position was confirmed with injection of nonionic contrast  material.  INJECTION:  1 mL of 40 mg per mL of Depo-Medrol.    5 mL of 0.5% bupivacaine 5 mg/mL .      EXAM: MR LUMBAR SPINE WO CONTRAST  LOCATION: [facility]  DATE/TIME: 10/22/2018 4:37 PM  IMPRESSION:   CONCLUSION:  1.  Mild degenerative changes of the lumbar spine.  2.  No high-grade spinal canal or neuroforaminal narrowing.    Bilateral AC joint arthropathy.  He desires repeat corticosteroid injections today.  The left AC joint was prepped in the usual fashion, and a combination of 1/2 cc 1% lidocaine 1/2 cc of Kenalog were injected into the joint in an inferolateral fashion.  Patient tolerated the procedure well and had no immediate complications.  The exact same procedure was performed on the right.  Follow-up as needed.     Review of Systems: A comprehensive review of systems was negative except as noted.     Medications, Allergies, Social, and Problem List   Current Outpatient Medications   Medication Sig Dispense Refill     ALPRAZolam (XANAX) 1 MG tablet Take 1 tablet (1 mg total) by mouth daily. 90 tablet 0     cyclobenzaprine (FLEXERIL) 10 MG tablet TAKE 1 TABLET(10 MG) BY MOUTH THREE TIMES DAILY AS NEEDED FOR MUSCLE SPASMS 90 tablet 0     fluticasone propionate (FLONASE) 50 mcg/actuation nasal spray SHAKE LIQUID AND USE 1 SPRAY IN EACH NOSTRIL TWICE DAILY 48 g 3     gabapentin (NEURONTIN) 300 MG capsule TAKE 1 CAPSULE(300 MG) BY MOUTH DAILY 90 capsule 3     ketoconazole (NIZORAL) 2 % cream Apply once daily to affected areas for two weeks 60 g 0     lansoprazole (PREVACID) 15 MG capsule Take 1 capsule (15 mg total) by mouth every morning. 90 capsule 3     lidocaine (XYLOCAINE) 5 % ointment Apply topical to affected areas twice daily as needed for pain. 35.44 g 3     naproxen (NAPROSYN) 500 MG tablet TAKE 1 TABLET(500 MG) BY MOUTH TWICE DAILY AS NEEDED 180 tablet 3     SUMAtriptan (IMITREX) 50 MG tablet Take 1 tablet (50 mg total) by mouth as needed for migraine (may repeat every 2 hours.  max  200MG/day). 10 tablet 11     traMADol (ULTRAM) 50 mg tablet TAKE 1 TABLET(50 MG) BY MOUTH DAILY 30 tablet 0     No current facility-administered medications for this visit.      Allergies   Allergen Reactions     Penicillins      Procaine Other (See Comments)     PN: LW Reaction: SYNCOPY     Social History     Tobacco Use     Smoking status: Never Smoker     Smokeless tobacco: Never Used   Substance Use Topics     Alcohol use: No     Drug use: Not on file     Patient Active Problem List   Diagnosis     Insomnia     Generalized Anxiety Disorder     Bulimia Nervosa     Post-traumatic Stress Disorder     Winged scapula     Pain medication agreement     Collagenous colitis        Reviewed, reconciled and updated       Physical Exam   General Appearance: Does not appear ill.  Mildly overweight.  He moves a bit slowly around the exam room and as he climbs up onto the exam table.    /76   Pulse 82   Temp 97.8  F (36.6  C) (Oral)   SpO2 98%     When I perform Pankaj test, he gets pain in the sacroiliac joint area.  No radiation down the legs.     Additional Information   I spent 25 minutes face time with the patient, with > 50% counseling, explaining and discussing with the patient the issues enumerated in the Assessment and Plan section of this note and answering questions. Afterwards, the patient was given a printout of the AVS, with attention to the content in the Patient Instruction section.       Maurice Kidd MD

## 2021-06-02 NOTE — TELEPHONE ENCOUNTER
Please advise, are you willing to send in referral for injections or would you like to see pt on Friday?

## 2021-06-02 NOTE — TELEPHONE ENCOUNTER
Called pt no answer LVM to call me back to office regarding Prescription information.    Who is calling:  Jerad  Reason for Call:  He has an appointment this Friday with PCP.  He said  is aware of his shoulder and back pain issues and has sent referrals over for injections in the past.  He was hoping he could get referral with out coming in to see him this Friday  Date of last appointment with primary care: 9/12/19  Okay to leave a detailed message: Yes

## 2021-06-02 NOTE — TELEPHONE ENCOUNTER
Pt had appointment today with Dr. Kidd. Orders were placed, pt will be keeping appointment on Friday with PCP

## 2021-06-03 VITALS
TEMPERATURE: 97.8 F | OXYGEN SATURATION: 98 % | HEART RATE: 82 BPM | SYSTOLIC BLOOD PRESSURE: 136 MMHG | DIASTOLIC BLOOD PRESSURE: 76 MMHG

## 2021-06-03 VITALS
SYSTOLIC BLOOD PRESSURE: 110 MMHG | BODY MASS INDEX: 24.39 KG/M2 | WEIGHT: 170 LBS | HEART RATE: 76 BPM | DIASTOLIC BLOOD PRESSURE: 68 MMHG

## 2021-06-03 NOTE — TELEPHONE ENCOUNTER
Reason for call: patient's insurance ([employer]).     Attempt #4 on 11/06/2019 at 4:09 pm    Outcome: NA, invalid number    Thank you,  PATRICK Chavarria Coordinator intern

## 2021-06-03 NOTE — TELEPHONE ENCOUNTER
Form filled out and faxed at pt's appointment on 11/22 - pt is going to go to phone store to see why his phone doesn't work for us   JUAN JOSE Wells  4:10 PM ........ 11/25/2019

## 2021-06-03 NOTE — TELEPHONE ENCOUNTER
Received MTM referral from patient's insurance ([employer])     Attempt: 4, 12/2/19  Result: No answer, ph# not valid    Thank you for the referral,    Dayana Blanco, MTM Coordinator Intern

## 2021-06-03 NOTE — TELEPHONE ENCOUNTER
RN cannot approve Refill Request    RN can NOT refill this medication med is not covered by policy/route to provider. Last office visit: 11/22/2019 Lan Murry MD Last Physical: Visit date not found Last MTM visit: Visit date not found Last visit same specialty: 11/22/2019 Lan Murry MD.  Next visit within 3 mo: Visit date not found  Next physical within 3 mo: Visit date not found      Angelina Alarcon, [facility] Triage/Med Refill 12/2/2019    Requested Prescriptions   Pending Prescriptions Disp Refills     traMADol (ULTRAM) 50 mg tablet [Pharmacy Med Name: TRAMADOL 50MG TABLETS] 30 tablet 0     Sig: TAKE 1 TABLET(50 MG) BY MOUTH DAILY       Controlled Substances Refill Protocol Failed - 12/2/2019  9:02 AM        Failed - Route all Controlled Substance Requests to Provider        Passed - Patient has controlled substance agreement in past 12 months     Encounter-Level CSA Scan Date:    There are no encounter-level csa scan date.               Passed - Visit with PCP or prescribing provider visit in past 12 months      Last office visit with prescriber/PCP: 11/22/2019 Lan Murry MD OR same dept: 11/22/2019 Lan Murry MD OR same specialty: 11/22/2019 Lan Murry MD Last physical: Visit date not found Last MTM visit: Visit date not found    Next visit within 3 mo: Visit date not found  Next physical within 3 mo: Visit date not found  Prescriber OR PCP: Lan Murry MD  Last diagnosis associated with med order: 1. Arthropathy Of Multiple Sites  - traMADol (ULTRAM) 50 mg tablet [Pharmacy Med Name: TRAMADOL 50MG TABLETS]; TAKE 1 TABLET(50 MG) BY MOUTH DAILY  Dispense: 30 tablet; Refill: 0

## 2021-06-03 NOTE — TELEPHONE ENCOUNTER
Name of form/paperwork: Other:  Transportation Form from [facility] [place]   Have you been seen for this request: N/A  Do we have the form: Patient will have [employer] fax form again  When is form needed by: as soon as possible   How would you like the form returned: Fax   Fax Number: Fax number provided on the form  Patient Notified form requests are processed in 3-5 business days: No  (If patient needs form sooner, please note that in this message.)  Okay to leave a detailed message? Yes  [phone]        Patient stated that his appointment is on 11/25/19. Patient stated that he will need this form completed as soon as possible. Patient stated that the [facility] [place] will fax the form again to fax number [phone].

## 2021-06-03 NOTE — TELEPHONE ENCOUNTER
Who is calling:  Patient   Reason for Call:  Patient called reporting he was seen in the [facility] ER today and was told to request the below test to be ordered by his primary.    Patient is questioning if this is something Lan Murry MD would be willing to do without seeing him clinic.    Patient stated he would like to know Lan Murry MD recommendations prior to scheduling an appointment.   Date of last appointment with primary care:  8/22/19  Okay to leave a detailed message: Yes      Orders being requested: Esophagram  Reason service is needed/diagnosis: Patient stated he was instructed by an ED provider to request this test to be ordered by his primary..  When are orders needed by: Patient did not state orders were urgent.  Where to send Orders: Chart - Patient stated he is not sure where he would go to have this test done.   Okay to leave detailed message?  Yes  [phone]

## 2021-06-03 NOTE — PROGRESS NOTES
ASSESSMENT and PLAN:    #1.  Zenker's diverticulum.  This is a new diagnosis for him.  Since he is completely asymptomatic and he has had only one episode of dysphagia, we will continue to monitor this.  If he has any recurrent dysphasia or other GI symptoms we could consider a referral for definitive treatment of this.  Otherwise we will continue to observe.    2.  Winged scapula.  He continues to be seen for this down at the [facility].  He needed paperwork for his ride share program filled out.  This was done.  Follow-up as needed.    Problem List Items Addressed This Visit     Zenker's diverticulum    Winged scapula - Primary          There are no Patient Instructions on file for this visit.    There are no discontinued medications.    No follow-ups on file.    CHIEF COMPLAINT:  Chief Complaint   Patient presents with     Follow-up     throat scan      Paperwork     fax recieved regarding transportation        HISTORY OF PRESENT ILLNESS:  Kwasi Silverman is a 49 y.o. male  presenting to the clinic today for follow-up of an ER visit for dysphasia.  He was seen on 11/12 with dysphasia along with pain at a focal point in the neck.  A CT scan of the soft tissues of the neck was performed at that time which showed potential evidence of a Zenker's diverticulum.  It was recommended that he have an esophagram done and this was performed on 11/13.  This showed the presence of a small Zenker's diverticulum without other abnormalities.  He came in today to discuss what to do regarding this.  He also had some paperwork regarding his right share program that he needed filled out.    REVIEW OF SYSTEMS:   Pertinent positives noted in HPI, remainder of ROS is negative.    MEDICATIONS:  Current Outpatient Medications   Medication Sig Dispense Refill     ALPRAZolam (XANAX) 1 MG tablet Take 1 tablet (1 mg total) by mouth daily. 90 tablet 0     cyclobenzaprine (FLEXERIL) 10 MG tablet TAKE 1 TABLET(10 MG) BY MOUTH THREE TIMES DAILY  AS NEEDED FOR MUSCLE SPASMS 90 tablet 0     famotidine (PEPCID) 20 MG tablet Take 1 tablet (20 mg total) by mouth 2 (two) times a day. 30 tablet 0     fluticasone propionate (FLONASE) 50 mcg/actuation nasal spray SHAKE LIQUID AND USE 1 SPRAY IN EACH NOSTRIL TWICE DAILY 48 g 3     gabapentin (NEURONTIN) 300 MG capsule TAKE 1 CAPSULE(300 MG) BY MOUTH DAILY 90 capsule 3     ketoconazole (NIZORAL) 2 % cream Apply once daily to affected areas for two weeks 60 g 0     lansoprazole (PREVACID) 15 MG capsule Take 1 capsule (15 mg total) by mouth every morning. 90 capsule 3     lidocaine (XYLOCAINE) 5 % ointment Apply topical to affected areas twice daily as needed for pain. 35.44 g 3     naproxen (NAPROSYN) 500 MG tablet TAKE 1 TABLET(500 MG) BY MOUTH TWICE DAILY AS NEEDED 180 tablet 3     SUMAtriptan (IMITREX) 50 MG tablet Take 1 tablet (50 mg total) by mouth as needed for migraine (may repeat every 2 hours.  max 200MG/day). 10 tablet 11     traMADol (ULTRAM) 50 mg tablet TAKE 1 TABLET(50 MG) BY MOUTH DAILY 30 tablet 0     No current facility-administered medications for this visit.        TOBACCO USE:  Social History     Tobacco Use   Smoking Status Never Smoker   Smokeless Tobacco Never Used       VITALS:  Vitals:    11/22/19 1542   BP: 110/68   Pulse: 76   Weight: 170 lb (77.1 kg)     Wt Readings from Last 3 Encounters:   11/22/19 170 lb (77.1 kg)   11/12/19 170 lb (77.1 kg)   06/07/19 164 lb (74.4 kg)         PHYSICAL EXAM:  Constitutional:  Reveals an alert, pleasant male.   HEET: Normocephalic, without obvious abnormality, atraumatic.   Neurologic: Normal gait and station  Psychologic: Normal affect

## 2021-06-03 NOTE — TELEPHONE ENCOUNTER
RN cannot approve Refill Request    RN can NOT refill this medication med is not covered by policy/route to provider. Last office visit: 11/22/2019 Lan Murry MD Last Physical: Visit date not found Last MTM visit: Visit date not found Last visit same specialty: 11/22/2019 Lan Murry MD.      Sheila Hollins, [facility] Triage/Med Refill 11/27/2019    Requested Prescriptions   Pending Prescriptions Disp Refills     cyclobenzaprine (FLEXERIL) 10 MG tablet [Pharmacy Med Name: CYCLOBENZAPRINE 10MG TABLETS] 90 tablet 0     Sig: TAKE 1 TABLET(10 MG) BY MOUTH THREE TIMES DAILY AS NEEDED FOR MUSCLE SPASMS       There is no refill protocol information for this order

## 2021-06-04 ENCOUNTER — OFFICE VISIT - HEALTHEAST (OUTPATIENT)
Dept: INTERNAL MEDICINE | Facility: CLINIC | Age: 51
End: 2021-06-04

## 2021-06-04 DIAGNOSIS — Z00.00 LABORATORY EXAMINATION ORDERED AS PART OF A ROUTINE GENERAL MEDICAL EXAMINATION: ICD-10-CM

## 2021-06-04 NOTE — TELEPHONE ENCOUNTER
Referral Request  Type of referral: [facility]- Orthopedics  Who s requesting: Patient  Why the request: yearly follow-up on right shoulder, had surgery 2018  Have you been seen for this request: Yes  Does patient have a preference on a group/provider? [facility], patient has appointment scheduled for 2/17/20  Okay to leave a detailed message?  Yes

## 2021-06-04 NOTE — TELEPHONE ENCOUNTER
Central PA team  [phone]  Pool: HE PA MED ([id number])          PA has been initiated.       PA form completed and faxed insurance via Cover My Meds     Key:  [id number] - PA Case ID: [id number]     Medication:  traMADol HCl 50MG tablets    Insurance:  [employer]        Response will be received via fax and may take up to 5-10 business days depending on plan

## 2021-06-04 NOTE — TELEPHONE ENCOUNTER
We pulled records from Care Everywhere. Dr. Murry is okay with this he just needs to know exactly what he needs to do. Called [facility] number below and it was a 30 min wait time. I will try again on Monday morning    Pt is aware that his phone number does not work when we call him but it is his correct mobile number. He is looking into this     JUAN JOSE Wells  3:56 PM ........ 12/6/2019

## 2021-06-04 NOTE — TELEPHONE ENCOUNTER
Called [facility] and they walked me through how to get to the form. Printed off and given to specialty schedulers. They are going to look into this  JUAN JOSE Wells  10:25 AM ........ 12/9/2019

## 2021-06-04 NOTE — TELEPHONE ENCOUNTER
Patient's referral to [facility] has been approved.   Auth # [id number]    Tried patient's phone # and it is not working.   LVM on patient's contact (Jorge Luis) for return call to clinic to notify patient.   Please confirm patient's ph #.

## 2021-06-04 NOTE — TELEPHONE ENCOUNTER
Who is calling:  Patient  Reason for Call:  Patient stated he saw his [facility] orthopedist last week. Patient stated his orthopedist wants to do another right shoulder surgery on 2/17/20. Patient stated he needs Lan Murry MD to do an in-network request form for the patient to get this surgery covered. Patient stated a form must be done and a phone call to [email or web address] is needed, [phone]. Patient stated he has had to do this for his other right shoulder surgery. Patient did not know the name of the surgery but thinks they will be removing a muscle from his shoulder.  Date of last appointment with primary care: 11/22/19  Okay to leave a detailed message: Yes  [phone]

## 2021-06-04 NOTE — TELEPHONE ENCOUNTER
RN cannot approve Refill Request    RN can NOT refill this medication med is not covered by policy/route to provider     . Last office visit: 11/22/2019 Lan Murry MD Last Physical: Visit date not found Last MTM visit: Visit date not found Last visit same specialty: 11/22/2019 Lan Murry MD.  Next visit within 3 mo: Visit date not found  Next physical within 3 mo: Visit date not found      Patti Villarreal, [facility] [place] Connection Triage/Med Refill 12/28/2019    Requested Prescriptions   Pending Prescriptions Disp Refills     cyclobenzaprine (FLEXERIL) 10 MG tablet [Pharmacy Med Name: CYCLOBENZAPRINE 10MG TABLETS] 90 tablet 0     Sig: TAKE 1 TABLET(10 MG) BY MOUTH THREE TIMES DAILY AS NEEDED FOR MUSCLE SPASMS       There is no refill protocol information for this order

## 2021-06-04 NOTE — TELEPHONE ENCOUNTER
Patient was transferred to central PA team in regards to his prior authorization for Tramadol.      Per patient he is already aware the PA is being initiated but stated he needs refills on both his Alprazolam 1mg and Tramadol 50mg.     Patient requesting these be sent to [id number] [place], [place] - [address] [facility] [address]    #[phone]    Told patient I would pass this information on to his care team.  Any questions you can call patient at #[phone]

## 2021-06-04 NOTE — TELEPHONE ENCOUNTER
Patient is out of medication.    Controlled Substance Refill Request  Medication Name:   Requested Prescriptions     Pending Prescriptions Disp Refills     ALPRAZolam (XANAX) 1 MG tablet [Pharmacy Med Name: ALPRAZOLAM 1MG TABLETS] 90 tablet 0     Sig: TAKE 1 TABLET(1 MG) BY MOUTH DAILY     traMADol (ULTRAM) 50 mg tablet [Pharmacy Med Name: TRAMADOL 50MG TABLETS] 30 tablet 0     Sig: TAKE 1 TABLET(50 MG) BY MOUTH DAILY     Date Last Fill: 12.3.2019  Pharmacy: [place] [id number]      Submit electronically to pharmacy  Controlled Substance Agreement Date Scanned:   Encounter-Level CSA Scan Date:    There are no encounter-level csa scan date.       Last office visit with prescriber/PCP: 11/22/2019 Lan Murry MD OR same dept: 11/22/2019 Lan Murry MD OR same specialty: 11/22/2019 Lan Murry MD  Last physical: Visit date not found Last MTM visit: Visit date not found

## 2021-06-04 NOTE — TELEPHONE ENCOUNTER
Prior Authorization Request  Who s requesting:  Patient  Pharmacy Name and Location: [facility]   Medication Name:    Disp Refills Start End    traMADol (ULTRAM) 50 mg tablet 30 tablet 0 12/2/2019     Sig: TAKE 1 TABLET(50 MG) BY MOUTH DAILY    Sent to pharmacy as: traMADol 50 mg tablet (ULTRAM)    E-Prescribing Status: Receipt confirmed by pharmacy (12/2/2019 11:13 AM CST)        Insurance Plan: [employer]  Insurance Member ID Number:  [id number]  Informed patient that prior authorizations can take up to 10 business days for response:   Yes  Okay to leave a detailed message: Yes    AND      Prior Authorization Request  Who s requesting:  Patient  Pharmacy Name and Location: [facility]   Medication Name:    Disp Refills Start End    ALPRAZolam (XANAX) 1 MG tablet 90 tablet 0 8/22/2019     Sig - Route: Take 1 tablet (1 mg total) by mouth daily. - Oral    Sent to pharmacy as: ALPRAZolam (XANAX) 1 MG tablet    E-Prescribing Status: Receipt confirmed by pharmacy (8/22/2019  2:25 PM CDT)    Renewals           Insurance Plan: [employer]  Insurance Member ID Number:  [id number]  Informed patient that prior authorizations can take up to 10 business days for response:   Yes  Okay to leave a detailed message: Yes

## 2021-06-04 NOTE — TELEPHONE ENCOUNTER
SS faxed in-network request form to [facility] [phone].   Unable to notify patient ph # is not working.

## 2021-06-05 NOTE — TELEPHONE ENCOUNTER
Controlled Substance Refill Request  Medication Name:   Requested Prescriptions     Pending Prescriptions Disp Refills     traMADol (ULTRAM) 50 mg tablet [Pharmacy Med Name: TRAMADOL 50MG TABLETS] 30 tablet 0     Sig: TAKE 1 TABLET(50 MG) BY MOUTH DAILY     Date Last Fill: 1/3/20  Requested [facility]: [facility]  Submit electronically to pharmacy  Controlled Substance Agreement on file:   Encounter-Level CSA Scan Date:    There are no encounter-level csa scan date.        Last office visit:  11/22/19

## 2021-06-05 NOTE — TELEPHONE ENCOUNTER
RN cannot approve Refill Request--Gabapentin    RN can NOT refill this medication No associated diagnosis code.     RN cannot approve Refill Request--Flexeril    RN can NOT refill this medication med is not covered by policy/route to provider.    Asha Hankins, [facility] [place] Connection Triage/Med Refill 1/26/2020    Requested Prescriptions   Pending Prescriptions Disp Refills     gabapentin (NEURONTIN) 300 MG capsule [Pharmacy Med Name: GABAPENTIN 300MG CAPSULES] 90 capsule 3     Sig: TAKE 1 CAPSULE(300 MG) BY MOUTH DAILY       Gabapentin/Levetiracetam/Tiagabine Refill Protocol  Passed - 1/26/2020  5:02 AM        Passed - PCP or prescribing provider visit in past 12 months or next 3 months     Last office visit with prescriber/PCP: 11/22/2019 Lan Murry MD OR same dept: 11/22/2019 Lan Murry MD OR same specialty: 11/22/2019 Lan Murry MD  Last physical: Visit date not found Last MTM visit: Visit date not found   Next visit within 3 mo: Visit date not found  Next physical within 3 mo: Visit date not found  Prescriber OR PCP: Lan Murry MD  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             cyclobenzaprine (FLEXERIL) 10 MG tablet [Pharmacy Med Name: CYCLOBENZAPRINE 10MG TABLETS] 90 tablet 0     Sig: TAKE 1 TABLET(10 MG) BY MOUTH THREE TIMES DAILY AS NEEDED FOR MUSCLE SPASMS       There is no refill protocol information for this order

## 2021-06-06 NOTE — TELEPHONE ENCOUNTER
Controlled Substance Refill Request  Medication Name:   Requested Prescriptions     Pending Prescriptions Disp Refills     traMADoL (ULTRAM) 50 mg tablet [Pharmacy Med Name: TRAMADOL 50MG TABLETS] 30 tablet 0     Sig: TAKE 1 TABLET(50 MG) BY MOUTH DAILY     Date Last Fill:   traMADol (ULTRAM) 50 mg tablet  30 tablet  0  1/28/2020   No    Sig: TAKE 1 TABLET(50 MG) BY MOUTH DAILY    Sent to pharmacy as: traMADol 50 mg tablet (ULTRAM)    E-Prescribing Status: Receipt confirmed by pharmacy (1/28/2020  4:40 PM CST)    Requested [facility]: [facility]  Submit electronically to pharmacy  Controlled Substance Agreement on file:   Encounter-Level CSA Scan Date:    There are no encounter-level csa scan date.        Last office visit:  11/22/19        
interrogation on chart

## 2021-06-06 NOTE — TELEPHONE ENCOUNTER
Central PA team  [phone]  Pool: HE PA MED ([id number])          PA has been initiated.       PA form completed and faxed insurance via Cover My Meds     Key:  SRINIVAS VELÁZQUEZ (Chilel: [id number])      Medication:  LANSOPRAZOLE 15 MG    Insurance:  [facility] PMAP        Response will be received via fax and may take up to 5-10 business days depending on plan

## 2021-06-06 NOTE — TELEPHONE ENCOUNTER
Prior Authorization Request    Who s requesting:  Pharmacy     Pharmacy Name and Location: [id number] [address]  AT [facility]  Medication Name:    Disp Refills Start End LIANE   lansoprazole (PREVACID) 15 MG capsule            Insurance Plan:   Insurance Member ID Number:  [id number]  CoverMyMeds Key: N/A  Informed patient that prior authorizations can take up to 10 business days for response:   NA  Okay to leave a detailed message: Yes

## 2021-06-06 NOTE — TELEPHONE ENCOUNTER
Central PA team  [phone]  Pool: HE PA MED ([id number])          PA has been initiated.       PA form completed and faxed insurance via Cover My Meds     Key:  Manual form faxed     Medication:  Lansoprazole    Insurance:  [employer]        Response will be received via fax and may take up to 5-10 business days depending on plan

## 2021-06-06 NOTE — TELEPHONE ENCOUNTER
Patient Returning Call  Reason for call:  Call back re: PA  Information relayed to patient:  Writer relayed message: need to find out all meds he has tried for acid reflux in the past. Pt relayed that he has tried Omeprazole which caused Throat swelling; Pepcid which caused Diarrhea, the only medication that help was the Lansoprazole.   Patient has additional questions:  No  If YES, what are your questions/concerns:  NA  Okay to leave a detailed message?: No

## 2021-06-06 NOTE — TELEPHONE ENCOUNTER
Looks like his insurance won't cover the lansoprazole.  I'd advise him just to pay cash for it using [employer].  Looks like he can get a 90 day supply for abotu 30 bucks

## 2021-06-07 NOTE — TELEPHONE ENCOUNTER
Medication Question or Clarification  Who is calling: Kwasi    What medication are you calling about (include dose and sig)?:     1)  Alprazolam 1 mg, one daily #30  2)  Tramadol 50 mg, one tablet daily #30    Patient has two more days of medication left but he wants to  these medications today.      Who prescribed the medication?: Lan Murry MD     What is your question/concern?: Patient is quarantined in [place], [place] due to exposure to COVID-19.  He is asking Dr. Murry to send this two medications to [facility]. Store #[id number]     Requested Pharmacy: [id number], [place], [place]    Okay to leave a detailed message?: Yes

## 2021-06-07 NOTE — TELEPHONE ENCOUNTER
RN cannot approve Refill Request    RN can NOT refill this medication med is not covered by policy/route to provider. Last office visit: 11/22/2019 Lan Murry MD Last Physical: Visit date not found Last MTM visit: Visit date not found Last visit same specialty: 11/22/2019 Lan Murry MD.  Next visit within 3 mo: Visit date not found  Next physical within 3 mo: Visit date not found      Nella Mendoza, [facility] Triage/Med Refill 4/25/2020    Requested Prescriptions   Pending Prescriptions Disp Refills     cyclobenzaprine (FLEXERIL) 10 MG tablet [Pharmacy Med Name: CYCLOBENZAPRINE 10MG TABLETS] 90 tablet 0     Sig: TAKE 1 TABLET(10 MG) BY MOUTH THREE TIMES DAILY AS NEEDED FOR MUSCLE SPASMS       There is no refill protocol information for this order

## 2021-06-07 NOTE — TELEPHONE ENCOUNTER
RN cannot approve Refill Request    RN can NOT refill this medication med is not covered by policy/route to provider     . Last office visit: 11/22/2019 Lan Murry MD Last Physical: Visit date not found Last MTM visit: Visit date not found Last visit same specialty: 11/22/2019 Lan Murry MD.  Next visit within 3 mo: Visit date not found  Next physical within 3 mo: Visit date not found      Patti Villarreal, [facility] Connection Triage/Med Refill 3/26/2020    Requested Prescriptions   Pending Prescriptions Disp Refills     cyclobenzaprine (FLEXERIL) 10 MG tablet [Pharmacy Med Name: CYCLOBENZAPRINE 10MG TABLETS] 90 tablet 0     Sig: TAKE 1 TABLET(10 MG) BY MOUTH THREE TIMES DAILY AS NEEDED FOR MUSCLE SPASMS       There is no refill protocol information for this order

## 2021-06-07 NOTE — TELEPHONE ENCOUNTER
Controlled Substance Refill Request  Medication Name:   Requested Prescriptions     Pending Prescriptions Disp Refills     traMADoL (ULTRAM) 50 mg tablet [Pharmacy Med Name: TRAMADOL 50MG TABLETS] 30 tablet 0     Sig: TAKE 1 TABLET(50 MG) BY MOUTH DAILY     Date Last Fill: 3/27/20  [facility]ed [facility]  Submit electronically to pharmacy  Controlled Substance Agreement on file:   Encounter-Level CSA Scan Date:    There are no encounter-level csa scan date.        Last office visit:  11/22/19

## 2021-06-08 ENCOUNTER — RECORDS - HEALTHEAST (OUTPATIENT)
Dept: ADMINISTRATIVE | Facility: CLINIC | Age: 51
End: 2021-06-08

## 2021-06-08 LAB
ANA SER QL: 4 U
DNA (DS) ANTIBODY - HISTORICAL: 73 IU
JO-1 AUTOANTIBODIES - HISTORICAL: 1 EU
SCL-70 AUTOANTIBODIES - HISTORICAL: 2 EU
SM (SMITH AUTOANTIBODIES - HISTORICAL: 5 EU
SM/RNP AUTOANTIBODIES - HISTORICAL: 2 EU
SS-A/RO AUTOANTIBODIES - HISTORICAL: 2 EU
SS-B/LA AUTOANTIBODIES - HISTORICAL: 3 EU

## 2021-06-08 NOTE — TELEPHONE ENCOUNTER
Controlled Substance Refill Request  Medication Name:   Requested Prescriptions     Pending Prescriptions Disp Refills     traMADoL (ULTRAM) 50 mg tablet [Pharmacy Med Name: TRAMADOL 50MG TABLETS] 30 tablet 0     Sig: TAKE 1 TABLET(50 MG) BY MOUTH DAILY     Date Last Fill: 5/1/20  Requested [facility]  Submit electronically to pharmacy  Controlled Substance Agreement on file:   Encounter-Level CSA Scan Date:    There are no encounter-level csa scan date.        Last office visit:  11/22/19

## 2021-06-08 NOTE — TELEPHONE ENCOUNTER
RN cannot approve Refill Request    RN can NOT refill this medication med is not covered by policy/route to provider. Last office visit: 11/22/2019 Lan Murry MD Last Physical: Visit date not found Last MTM visit: Visit date not found Last visit same specialty: 11/22/2019 Lan Murry MD.  Next visit within 3 mo: Visit date not found  Next physical within 3 mo: Visit date not found      Nella Mendoza, [facility] Triage/Med Refill 5/25/2020    Requested Prescriptions   Pending Prescriptions Disp Refills     cyclobenzaprine (FLEXERIL) 10 MG tablet [Pharmacy Med Name: CYCLOBENZAPRINE 10MG TABLETS] 90 tablet 0     Sig: TAKE 1 TABLET(10 MG) BY MOUTH THREE TIMES DAILY AS NEEDED FOR MUSCLE SPASMS       There is no refill protocol information for this order

## 2021-06-09 ENCOUNTER — RECORDS - HEALTHEAST (OUTPATIENT)
Dept: INTERNAL MEDICINE | Facility: CLINIC | Age: 51
End: 2021-06-09

## 2021-06-09 NOTE — TELEPHONE ENCOUNTER
Spoke to pt - he is taking this med(not on med list)and will need a refill    JUAN JOSE Wells  9:43 AM ........ 7/7/2020

## 2021-06-09 NOTE — TELEPHONE ENCOUNTER
Controlled Substance Refill Request  Medication Name:   Requested Prescriptions     Pending Prescriptions Disp Refills     ALPRAZolam (XANAX) 1 MG tablet [Pharmacy Med Name: ALPRAZOLAM 1MG TABLETS] 90 tablet 0     Sig: TAKE 1 TABLET(1 MG) BY MOUTH DAILY     traMADoL (ULTRAM) 50 mg tablet [Pharmacy Med Name: TRAMADOL 50MG TABLETS] 30 tablet 0     Sig: TAKE 1 TABLET(50 MG) BY MOUTH DAILY     Date Last Fill: 3/27/20 xanax; 6/1/20 tramadol  Requested [facility]acy[facility]  Submit electronically to pharmacy  Controlled Substance Agreement on file:   Encounter-Level CSA Scan Date:    There are no encounter-level csa scan date.        Last office visit:  11/22/19

## 2021-06-09 NOTE — TELEPHONE ENCOUNTER
RN cannot approve Refill Request    RN can NOT refill this medication med is not covered by policy/route to provider     . Last office visit: Visit date not found Last Physical: Visit date not found Last MTM visit: Visit date not found Last visit same specialty: 11/22/2019 Lan Murry MD.  Next visit within 3 mo: Visit date not found  Next physical within 3 mo: Visit date not found      Patti Villarreal, [facility] Triage/Med Refill 7/1/2020    Requested Prescriptions   Pending Prescriptions Disp Refills     cyclobenzaprine (FLEXERIL) 10 MG tablet [Pharmacy Med Name: CYCLOBENZAPRINE 10MG TABLETS] 90 tablet 0     Sig: TAKE 1 TABLET(10 MG) BY MOUTH THREE TIMES DAILY AS NEEDED FOR MUSCLE SPASMS       There is no refill protocol information for this order

## 2021-06-09 NOTE — TELEPHONE ENCOUNTER
RN cannot approve Refill Request    RN can NOT refill this medication med is not covered by policy/route to provider       . Last office visit: Visit date not found Last Physical: Visit date not found Last MTM visit: Visit date not found Last visit same specialty: 11/22/2019 Lan Murry MD.  Next visit within 3 mo: Visit date not found  Next physical within 3 mo: Visit date not found      Patti Villarreal, [facility] Triage/Med Refill 6/24/2020    Requested Prescriptions   Pending Prescriptions Disp Refills     cyclobenzaprine (FLEXERIL) 10 MG tablet [Pharmacy Med Name: CYCLOBENZAPRINE 10MG TABLETS] 90 tablet 0     Sig: TAKE 1 TABLET(10 MG) BY MOUTH THREE TIMES DAILY AS NEEDED FOR MUSCLE SPASMS       There is no refill protocol information for this order

## 2021-06-09 NOTE — TELEPHONE ENCOUNTER
Provider to fill 30 tablets, he should be taking this as an as needed medicine only, not three times daily scheduled.

## 2021-06-09 NOTE — TELEPHONE ENCOUNTER
RN cannot approve Refill Request    RN can NOT refill this medication med is not covered by policy/route to provider. Last office visit: 11/22/2019 Lan Murry MD Last Physical: Visit date not found Last MTM visit: Visit date not found Last visit same specialty: 11/22/2019 Lan Murry MD.  Next visit within 3 mo: Visit date not found  Next physical within 3 mo: Visit date not found      Nella Mendoza, [facility] Triage/Med Refill 7/25/2020    Requested Prescriptions   Pending Prescriptions Disp Refills     cyclobenzaprine (FLEXERIL) 10 MG tablet [Pharmacy Med Name: CYCLOBENZAPRINE 10MG TABLETS] 90 tablet 0     Sig: TAKE 1 TABLET(10 MG) BY MOUTH THREE TIMES DAILY AS NEEDED FOR MUSCLE SPASMS       There is no refill protocol information for this order

## 2021-06-10 ENCOUNTER — COMMUNICATION - HEALTHEAST (OUTPATIENT)
Dept: INTERNAL MEDICINE | Facility: CLINIC | Age: 51
End: 2021-06-10

## 2021-06-10 NOTE — PROGRESS NOTES
Clinic Care Coordination Contact    Situation: Patient chart reviewed by care coordinator. Pt on external d/c list    Background: Pt in ED with chest pain.  Pt has not seen PCP since 11/2019.  Pt is scheduled for PCP visit 9/8/20.     Assessment: Not placing CC order at this time    Plan/Recommendations: PCP to refer after upcoming visit if needed

## 2021-06-10 NOTE — PROGRESS NOTES
ASSESSMENT:    Follow-up for pectoral muscle transfer to the scapula to treat winged scapula, POD #8.  Layo is doing well but is having a difficult time sleeping at night because of the way he needs to be positioned in his sling.    PLAN:  I prescribed him some Ambien to use at night.  I instructed that he not use his Xanax or oxycodone at the same time and he understands this.  I reassured him that his incisions look clean dry and intact with no signs of infection.  He will follow-up with us as needed.  Of note he has follow-up with his orthopedic surgeon in 5 weeks at the [facility].  Problem List Items Addressed This Visit     None          There are no discontinued medications.    No Follow-up on file.    There are no Patient Instructions on file for this visit.    CHIEF COMPLAINT:  Chief Complaint   Patient presents with     Follow-up     transfer of endon in right arm     All medications have been reconciled.     HISTORY OF PRESENT ILLNESS:  Kwasi Silverman is a 47 y.o. male presenting to the clinic today for follow up for a recent hospitalization. He presented to [facility] in [place], [place] on April 26 for scheduled right pectoralis transfer for treatment of scapular winging. His surgery was conducted successfully by Dr. Figueroa in orthopedic surgery without complication. He was transferred to the orthopedic unit for postoperative monitoring. He was discharged in stable condition on April 28 and instructed to follow up with his primary care physician and Dr. Figueroa's office within one month.     Presently in the clinic, he reports that he is recovering well from his his right pectoralis transfer surgery for treatment of scapular winging. He requests a second explanation regarding the details of his recent procedure. He presents medical disability paperwork to have completed, as he will be unable to work for the next 6 months, given the required recovery from his recent surgery. He presents his  "incision sites for evaluation. He is currently taking 1 tablet of oxycodone BID for pain management. His sleeping ability is inhibited secondary to inability to move his body at night. He attempted sleeping in a recliner after his surgery but did not tolerate this well. He requests a prescription for for a sleep aid medication. He inquires if he could take Tylenol and cyclobenzaprine alongside Ambien before bed.     REVIEW OF SYSTEMS:   He denies abnormalities in the following patterns since discharge: bowel passage, urinary passage, eating, or drinking. All other systems are negative.    PFSH:  He works in [employer].   Allergies   Allergen Reactions     Penicillins        TOBACCO USE:  History   Smoking Status     Never Smoker   Smokeless Tobacco     Not on file       VITALS:  Vitals:    05/05/17 1342   BP: 110/80   Pulse: 84   Weight: 182 lb (82.6 kg)   Height: 5' 8.5\" (1.74 m)     Wt Readings from Last 3 Encounters:   05/05/17 182 lb (82.6 kg)   09/14/16 182 lb (82.6 kg)   05/06/16 182 lb 9.6 oz (82.8 kg)         PHYSICAL EXAM:  Constitutional:  Reveals an alert and oriented x3, pleasant male with no acute distress.   HEENT: Pupils are equal, round, and reactive to light. Oropharynx is clear. Ears: External canals, TMs clear.   LYMPHATIC: No anterior or posterior lymphadenopathy   CV: S1, S2, regular rate and rhythm, no murmurs, gallops, or rubs   LUNGS: Clear to auscultation bilaterally   ABDOMEN: Soft, non-tender, non-distended   EXTREMITIES: No pedal swelling bilaterally.   MUSCULOSKELETAL: Normal range of motion. No joint swelling or deformity.      ADDITIONAL HISTORY SUMMARIZED (FROM OLD RECORDS OR HISTORY FROM SOMEONE OTHER THAN THE PATIENT OR ANOTHER HEALTHCARE PROVIDER), COLONOSCOPY (2 TOTAL): Reviewed discharge summary from April 28 regarding scapular winging. Reviewed operation note from Dr. Figueroa at [facility] on April 26 regarding right pectoralis major transfer for scapular winging " treatment.     DECISION TO OBTAIN EXTRA INFORMATION (OLD RECORDS REQUESTED OR HISTORY FROM ANOTHER PERSON OR ACCESSING CARE EVERYWHERE) (1 TOTAL):none    RADIOLOGY TESTS SUMMARIZED OR ORDERED (XRAY/CT/MRI/DXA/MAMMO) (1 TOTAL): none    LABS REVIEWED OR ORDERED (1 TOTAL): Reviewed labs from admission April 26-28. .    MEDICINE TESTS SUMMARIZED OR ORDERED (EKG/ECHO/EGD) (1 TOTAL): none    INDEPENDENT REVIEW OF EKG OR X-RAY (2 EACH): none      The visit lasted a total of 18 minutes face to face with the patient. Over 50% of the time was spent counseling and educating the patient about scapular winging follow up.    IDaniel, am scribing for and in the presence of, Dr. Lan Murry.    I, Dr. Lan Murry, personally performed the services described in this documentation, as scribed by Daniel Balderrama in my presence, and it is both accurate and complete.    MEDICATIONS:  Current Outpatient Prescriptions   Medication Sig Dispense Refill     ALPRAZolam (XANAX) 1 MG tablet Take 1 tablet (1 mg total) by mouth daily. 90 tablet 1     cyclobenzaprine (FLEXERIL) 10 MG tablet TAKE 1 TABLET (10 MG TOTAL) BY MOUTH 3 (THREE) TIMES A DAY AS NEEDED FOR MUSCLE SPASMS. 90 tablet 1     cyclobenzaprine (FLEXERIL) 10 MG tablet TAKE 1 TABLET (10 MG TOTAL) BY MOUTH 3 (THREE) TIMES A DAY AS NEEDED FOR MUSCLE SPASMS. 90 tablet 1     dexamethasone (DECADRON) 4 mg/mL injection FOR USE DURING PT SESSION - IONTOPHORESIS  0     DULoxetine (CYMBALTA) 30 MG capsule Take 30 mg by mouth 2 (two) times a day.       fluticasone (FLONASE) 50 mcg/actuation nasal spray INSTILL 1 SRAY INTO EACH NOSTRIL 2 TIMES DAILY 16 g 0     lidocaine (LIDODERM) 5 % APPLY 1-3 PATCHES TO AFFECTED AREAS AS NEEDED, AS DIRECTED. REMOVE AFTER 12 HOURS 30 patch 3     lidocaine (XYLOCAINE) 5 % ointment Apply topical to affected areas twice daily as needed for pain. 35.44 g 3     naproxen (NAPROSYN) 500 MG tablet TAKE 1 TABLET BY MOUTH TWICE A DAY AS NEEDED 180 tablet 1      omeprazole (PRILOSEC) 20 MG capsule Take 1 capsule (20 mg total) by mouth daily. 90 capsule 3     oxyCODONE (ROXICODONE) 5 MG immediate release tablet Take 1 tablet (5 mg total) by mouth every 4 (four) hours as needed for pain. 15 tablet 0     SUMAtriptan (IMITREX) 50 MG tablet Take 1 tablet (50 mg total) by mouth as needed for migraine (may repeat every 2 hours.  max 200MG/day). 10 tablet 11     tamsulosin (FLOMAX) 0.4 mg Cp24 Take 1 capsule (0.4 mg total) by mouth daily. 5 capsule 0     No current facility-administered medications for this visit.        Total data points: 3

## 2021-06-10 NOTE — TELEPHONE ENCOUNTER
Controlled Substance Refill Request  Medication Name:   Requested Prescriptions     Pending Prescriptions Disp Refills     traMADoL (ULTRAM) 50 mg tablet [Pharmacy Med Name: TRAMADOL 50MG TABLETS] 30 tablet 0     Sig: TAKE 1 TABLET(50 MG) BY MOUTH DAILY     cyclobenzaprine (FLEXERIL) 10 MG tablet [Pharmacy Med Name: CYCLOBENZAPRINE 10MG TABLETS] 90 tablet 0     Sig: TAKE 1 TABLET(10 MG) BY MOUTH THREE TIMES DAILY AS NEEDED FOR MUSCLE SPASMS     Date Last Fill: 7/1/20  [facility]ed [facility]acy[facility]  Submit electronically to pharmacy  Controlled Substance Agreement on file:   Encounter-Level CSA Scan Date:    There are no encounter-level csa scan date.        Last office visit:  11/22/19         Flexeril filled 7/25/20

## 2021-06-11 NOTE — TELEPHONE ENCOUNTER
Controlled Substance Refill Request  Medication Name:   Requested Prescriptions     Pending Prescriptions Disp Refills     naproxen (NAPROSYN) 500 MG tablet [Pharmacy Med Name: NAPROXEN 500MG TABLETS] 180 tablet 3     Sig: TAKE 1 TABLET(500 MG) BY MOUTH TWICE DAILY AS NEEDED     gabapentin (NEURONTIN) 300 MG capsule [Pharmacy Med Name: GABAPENTIN 300MG CAPSULES] 90 capsule 3     Sig: TAKE 1 CAPSULE(300 MG) BY MOUTH DAILY     cyclobenzaprine (FLEXERIL) 10 MG tablet [Pharmacy Med Name: CYCLOBENZAPRINE 10MG TABLETS] 90 tablet 0     Sig: TAKE 1 TABLET(10 MG) BY MOUTH THREE TIMES DAILY AS NEEDED FOR MUSCLE SPASMS     ALPRAZolam (XANAX) 1 MG tablet [Pharmacy Med Name: ALPRAZOLAM 1MG TABLETS] 90 tablet 0     Sig: TAKE 1 TABLET(1 MG) BY MOUTH DAILY     Date Last Fill: 07/01/2020  Is patient out of medication?:  Yes  Patient notified refills processed within 3 business days:  Yes  [facility]ed [facility]acy[facility]  Submit electronically to pharmacy  Controlled Substance Agreement on file:   Encounter-Level CSA Scan Date:    There are no encounter-level csa scan date.        Last office visit:  09/08/2020

## 2021-06-11 NOTE — TELEPHONE ENCOUNTER
Controlled Substance Refill Request  Medication Name:   Requested Prescriptions     Pending Prescriptions Disp Refills     traMADoL (ULTRAM) 50 mg tablet [Pharmacy Med Name: TRAMADOL 50MG TABLETS] 30 tablet 0     Sig: TAKE 1 TABLET(50 MG) BY MOUTH DAILY     cyclobenzaprine (FLEXERIL) 10 MG tablet [Pharmacy Med Name: CYCLOBENZAPRINE 10MG TABLETS] 90 tablet 0     Sig: TAKE 1 TABLET(10 MG) BY MOUTH THREE TIMES DAILY AS NEEDED FOR MUSCLE SPASMS     Date Last Fill: 8/1/20  Requested [facility]acy[facility]  Submit electronically to pharmacy  Controlled Substance Agreement on file:   Encounter-Level CSA Scan Date:    There are no encounter-level csa scan date.        Last office visit:  11/22/19      Provider Refill Request  Medication:  flexeril  RN can NOT refill this medication per RN refill protocol because med is not covered by policy/route to provider

## 2021-06-11 NOTE — TELEPHONE ENCOUNTER
Refill Approved    Rx renewed per Medication Renewal Policy. Medication was last renewed on 7/3/20.    Patti Villarreal, [facility] Connection Triage/Med Refill 10/1/2020     Requested Prescriptions   Pending Prescriptions Disp Refills     naproxen (NAPROSYN) 500 MG tablet [Pharmacy Med Name: NAPROXEN 500MG TABLETS] 180 tablet 3     Sig: TAKE 1 TABLET(500 MG) BY MOUTH TWICE DAILY AS NEEDED       There is no refill protocol information for this order        gabapentin (NEURONTIN) 300 MG capsule [Pharmacy Med Name: GABAPENTIN 300MG CAPSULES] 90 capsule 3     Sig: TAKE 1 CAPSULE(300 MG) BY MOUTH DAILY       Gabapentin/Levetiracetam/Tiagabine Refill Protocol  Passed - 10/1/2020 11:28 AM        Passed - PCP or prescribing provider visit in past 12 months or next 3 months     Last office visit with prescriber/PCP: 9/8/2020 Lan Murry MD OR same dept: 9/8/2020 Lan Murry MD OR same specialty: 9/8/2020 Lan Murry MD  Last physical: Visit date not found Last MTM visit: Visit date not found   Next visit within 3 mo: Visit date not found  Next physical within 3 mo: Visit date not found  Prescriber OR PCP: Lan Murry MD  Last diagnosis associated with med order: 1. Pain  - naproxen (NAPROSYN) 500 MG tablet [Pharmacy Med Name: NAPROXEN 500MG TABLETS]; TAKE 1 TABLET(500 MG) BY MOUTH TWICE DAILY AS NEEDED  Dispense: 180 tablet; Refill: 3    2. Winged scapula  - gabapentin (NEURONTIN) 300 MG capsule [Pharmacy Med Name: GABAPENTIN 300MG CAPSULES]; TAKE 1 CAPSULE(300 MG) BY MOUTH DAILY  Dispense: 90 capsule; Refill: 3  - cyclobenzaprine (FLEXERIL) 10 MG tablet [Pharmacy Med Name: CYCLOBENZAPRINE 10MG TABLETS]; TAKE 1 TABLET(10 MG) BY MOUTH THREE TIMES DAILY AS NEEDED FOR MUSCLE SPASMS  Dispense: 90 tablet; Refill: 0    3. Dysesthesia  - ALPRAZolam (XANAX) 1 MG tablet [Pharmacy Med Name: ALPRAZOLAM 1MG TABLETS]; TAKE 1 TABLET(1 MG) BY MOUTH DAILY  Dispense: 90 tablet; Refill: 0    If protocol  passes may refill for 12 months if within 3 months of last provider visit (or a total of 15 months).                cyclobenzaprine (FLEXERIL) 10 MG tablet [Pharmacy Med Name: CYCLOBENZAPRINE 10MG TABLETS] 90 tablet 0     Sig: TAKE 1 TABLET(10 MG) BY MOUTH THREE TIMES DAILY AS NEEDED FOR MUSCLE SPASMS       There is no refill protocol information for this order        ALPRAZolam (XANAX) 1 MG tablet [Pharmacy Med Name: ALPRAZOLAM 1MG TABLETS] 90 tablet 0     Sig: TAKE 1 TABLET(1 MG) BY MOUTH DAILY       Controlled Substances Refill Protocol Failed - 10/1/2020 11:28 AM        Failed - Route all Controlled Substance Requests to Provider        Passed - Patient has controlled substance agreement in past 12 months     Encounter-Level CSA Scan Date:    There are no encounter-level csa scan date.               Passed - Visit with PCP or prescribing provider visit in past 12 months      Last office visit with prescriber/PCP: 9/8/2020 Lan Murry MD OR same dept: 9/8/2020 Lan Murry MD OR same specialty: 9/8/2020 Lan Murry MD Last physical: Visit date not found Last MTM visit: Visit date not found    Next visit within 3 mo: Visit date not found  Next physical within 3 mo: Visit date not found  Prescriber OR PCP: Lan Murry MD  Last diagnosis associated with med order: 1. Pain  - naproxen (NAPROSYN) 500 MG tablet [Pharmacy Med Name: NAPROXEN 500MG TABLETS]; TAKE 1 TABLET(500 MG) BY MOUTH TWICE DAILY AS NEEDED  Dispense: 180 tablet; Refill: 3    2. Winged scapula  - gabapentin (NEURONTIN) 300 MG capsule [Pharmacy Med Name: GABAPENTIN 300MG CAPSULES]; TAKE 1 CAPSULE(300 MG) BY MOUTH DAILY  Dispense: 90 capsule; Refill: 3  - cyclobenzaprine (FLEXERIL) 10 MG tablet [Pharmacy Med Name: CYCLOBENZAPRINE 10MG TABLETS]; TAKE 1 TABLET(10 MG) BY MOUTH THREE TIMES DAILY AS NEEDED FOR MUSCLE SPASMS  Dispense: 90 tablet; Refill: 0    3. Dysesthesia  - ALPRAZolam (XANAX) 1 MG tablet [Pharmacy  Med Name: ALPRAZOLAM 1MG TABLETS]; TAKE 1 TABLET(1 MG) BY MOUTH DAILY  Dispense: 90 tablet; Refill: 0

## 2021-06-11 NOTE — PROGRESS NOTES
ASSESSMENT and PLAN:    #1.  History of pancreatitis about 4 weeks ago, now completely asymptomatic.  Etiology of this is unclear at this time.  Again no history of significant alcohol use and no stones were noted on his CT scan.  I am going to obtain a repeat lipase today.  If he has recurrent symptoms he should let me know right away.    2.  Polyarthralgias.  Check a CBC, CMP, CRP, sed rate, and Lyme titer.  I will call him with the results and further recommendations.    3.  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  Follow-up with the [facility] as scheduled.  Problem List Items Addressed This Visit     None      Visit Diagnoses     Arthralgia of both knees    -  Primary    Relevant Orders    HM1(CBC and Differential) (Completed)    Comprehensive Metabolic Panel (Completed)    C-Reactive Protein(CRP) (Completed)    Sedimentation Rate (Completed)    Lyme Antibody Cascade (Completed)    Lipase (Completed)    HM1 (CBC with Diff) (Completed)          There are no Patient Instructions on file for this visit.    There are no discontinued medications.    No follow-ups on file.    CHIEF COMPLAINT:  Chief Complaint   Patient presents with     Shoulder Pain     update from [place]      Joint Pain     hands, toes, hips, elbows  - has been staying up [place] for most of quarentine      Follow-up     ER visit from 8/12      Sinus Problem     ear pressure        HISTORY OF PRESENT ILLNESS:  Kwasi Silverman is a 50 y.o. male  presenting to the clinic today for follow-up of a few different issues.    Layo has been spending most of quarantine up at his 's place in [place], [place].  He was seen in the emergency room in [facility] on 8/12 after presenting with chest pain after mowing the lawn.  Cardiac work-up was negative at that time, but a CT scan of the abdomen showed some fluid around the pancreas.  Lipase was elevated at that time as well.  Layo does not drink alcohol and no stones were noted on the CT scan.  No history of  hypertriglyceridemia.  He states that he is felt completely well over the last 3 weeks.    Layo states that he has some joint pain in the hands, feet, hips, and elbows.  No joint swelling that he knows of.  He has no history of autoimmune disease personally.  This is been going on for about the last 2 to 3 months.    He had follow-up at the [facility] for his chronic winged scapula.  It sounds like the pectoral transfer surgery that he had is not working as well and he is having some more winging.  He is considering undergoing additional surgery for this.    REVIEW OF SYSTEMS:   Pertinent positives noted in HPI, remainder of ROS is negative.    MEDICATIONS:  Current Outpatient Medications   Medication Sig Dispense Refill     ALPRAZolam (XANAX) 1 MG tablet TAKE 1 TABLET(1 MG) BY MOUTH DAILY 90 tablet 0     cyclobenzaprine (FLEXERIL) 10 MG tablet TAKE 1 TABLET(10 MG) BY MOUTH THREE TIMES DAILY AS NEEDED FOR MUSCLE SPASMS 90 tablet 0     famotidine (PEPCID) 20 MG tablet Take 1 tablet (20 mg total) by mouth 2 (two) times a day. 30 tablet 0     fluticasone propionate (FLONASE) 50 mcg/actuation nasal spray SHAKE LIQUID AND USE 1 SPRAY IN EACH NOSTRIL TWICE DAILY 48 g 3     gabapentin (NEURONTIN) 300 MG capsule TAKE 1 CAPSULE(300 MG) BY MOUTH DAILY 90 capsule 3     ketoconazole (NIZORAL) 2 % cream Apply once daily to affected areas for two weeks 60 g 0     lansoprazole (PREVACID) 15 MG capsule Take 1 capsule (15 mg total) by mouth every other day. 45 capsule 3     lidocaine (XYLOCAINE) 5 % ointment Apply topical to affected areas twice daily as needed for pain. 35.44 g 3     naproxen (NAPROSYN) 500 MG tablet TAKE 1 TABLET(500 MG) BY MOUTH TWICE DAILY AS NEEDED 180 tablet 3     SUMAtriptan (IMITREX) 50 MG tablet Take 1 tablet (50 mg total) by mouth as needed for migraine (may repeat every 2 hours.  max 200MG/day). 10 tablet 11     traMADoL (ULTRAM) 50 mg tablet TAKE 1 TABLET(50 MG) BY MOUTH DAILY 30 tablet 0     traMADoL  (ULTRAM) 50 mg tablet TAKE 1 TABLET(50 MG) BY MOUTH DAILY 30 tablet 0     traMADoL (ULTRAM) 50 mg tablet TAKE 1 TABLET(50 MG) BY MOUTH DAILY 30 tablet 0     No current facility-administered medications for this visit.        TOBACCO USE:  Social History     Tobacco Use   Smoking Status Never Smoker   Smokeless Tobacco Never Used       VITALS:  Vitals:    09/08/20 1611   BP: 108/78   Pulse: 76     Wt Readings from Last 3 Encounters:   11/22/19 170 lb (77.1 kg)   11/12/19 170 lb (77.1 kg)   06/07/19 164 lb (74.4 kg)         PHYSICAL EXAM:  Constitutional:  Reveals an alert, pleasant male.   HEET: Normocephalic, without obvious abnormality, atraumatic.   Neurologic: Normal gait and station  Psychologic: Normal affect

## 2021-06-11 NOTE — TELEPHONE ENCOUNTER
RN cannot approve Refill Request    RN can NOT refill this medication med is not covered by policy/route to provider. Last office visit: 9/8/2020 Lan Murry MD Last Physical: Visit date not found Last MTM visit: Visit date not found Last visit same specialty: 9/8/2020 Lan Mrury MD.  Next visit within 3 mo: Visit date not found  Next physical within 3 mo: Visit date not found      Nella Mendoza, [facility] Triage/Med Refill 9/23/2020    Requested Prescriptions   Pending Prescriptions Disp Refills     cyclobenzaprine (FLEXERIL) 10 MG tablet [Pharmacy Med Name: CYCLOBENZAPRINE 10MG TABLETS] 90 tablet 0     Sig: TAKE 1 TABLET(10 MG) BY MOUTH THREE TIMES DAILY AS NEEDED FOR MUSCLE SPASMS       There is no refill protocol information for this order

## 2021-06-11 NOTE — TELEPHONE ENCOUNTER
RN cannot approve Refill Request    RN can NOT refill this medication med is not covered by policy/route to provider. Last office visit: 9/8/2020 Lan Murry MD Last Physical: Visit date not found Last MTM visit: Visit date not found Last visit same specialty: 9/8/2020 Lan Murry MD.  Next visit within 3 mo: Visit date not found  Next physical within 3 mo: Visit date not found      Patti Villarreal, [facility] Connection Triage/Med Refill 10/1/2020    Requested Prescriptions   Pending Prescriptions Disp Refills     naproxen (NAPROSYN) 500 MG tablet [Pharmacy Med Name: NAPROXEN 500MG TABLETS] 180 tablet 3     Sig: TAKE 1 TABLET(500 MG) BY MOUTH TWICE DAILY AS NEEDED       There is no refill protocol information for this order        cyclobenzaprine (FLEXERIL) 10 MG tablet [Pharmacy Med Name: CYCLOBENZAPRINE 10MG TABLETS] 90 tablet 0     Sig: TAKE 1 TABLET(10 MG) BY MOUTH THREE TIMES DAILY AS NEEDED FOR MUSCLE SPASMS       There is no refill protocol information for this order        ALPRAZolam (XANAX) 1 MG tablet [Pharmacy Med Name: ALPRAZOLAM 1MG TABLETS] 90 tablet 0     Sig: TAKE 1 TABLET(1 MG) BY MOUTH DAILY       Controlled Substances Refill Protocol Failed - 10/1/2020 11:28 AM        Failed - Route all Controlled Substance Requests to Provider        Passed - Patient has controlled substance agreement in past 12 months     Encounter-Level CSA Scan Date:    There are no encounter-level csa scan date.               Passed - Visit with PCP or prescribing provider visit in past 12 months      Last office visit with prescriber/PCP: 9/8/2020 Lan Murry MD OR same dept: 9/8/2020 Lan Murry MD OR same specialty: 9/8/2020 Lan Murry MD Last physical: Visit date not found Last MTM visit: Visit date not found    Next visit within 3 mo: Visit date not found  Next physical within 3 mo: Visit date not found  Prescriber OR PCP: Lan Murry MD  Last diagnosis  associated with med order: 1. Pain  - naproxen (NAPROSYN) 500 MG tablet [Pharmacy Med Name: NAPROXEN 500MG TABLETS]; TAKE 1 TABLET(500 MG) BY MOUTH TWICE DAILY AS NEEDED  Dispense: 180 tablet; Refill: 3    2. Winged scapula  - gabapentin (NEURONTIN) 300 MG capsule; TAKE 1 CAPSULE(300 MG) BY MOUTH DAILY  Dispense: 90 capsule; Refill: 3  - cyclobenzaprine (FLEXERIL) 10 MG tablet [Pharmacy Med Name: CYCLOBENZAPRINE 10MG TABLETS]; TAKE 1 TABLET(10 MG) BY MOUTH THREE TIMES DAILY AS NEEDED FOR MUSCLE SPASMS  Dispense: 90 tablet; Refill: 0    3. Dysesthesia  - ALPRAZolam (XANAX) 1 MG tablet [Pharmacy Med Name: ALPRAZOLAM 1MG TABLETS]; TAKE 1 TABLET(1 MG) BY MOUTH DAILY  Dispense: 90 tablet; Refill: 0                Signed Prescriptions Disp Refills    gabapentin (NEURONTIN) 300 MG capsule 90 capsule 3     Sig: TAKE 1 CAPSULE(300 MG) BY MOUTH DAILY       Gabapentin/Levetiracetam/Tiagabine Refill Protocol  Passed - 10/1/2020 11:28 AM        Passed - PCP or prescribing provider visit in past 12 months or next 3 months     Last office visit with prescriber/PCP: 9/8/2020 Lan Murry MD OR same dept: 9/8/2020 Lan Murry MD OR same specialty: 9/8/2020 Lan Murry MD  Last physical: Visit date not found Last MTM visit: Visit date not found   Next visit within 3 mo: Visit date not found  Next physical within 3 mo: Visit date not found  Prescriber OR PCP: Lan Murry MD  Last diagnosis associated with med order: 1. Pain  - naproxen (NAPROSYN) 500 MG tablet [Pharmacy Med Name: NAPROXEN 500MG TABLETS]; TAKE 1 TABLET(500 MG) BY MOUTH TWICE DAILY AS NEEDED  Dispense: 180 tablet; Refill: 3    2. Winged scapula  - gabapentin (NEURONTIN) 300 MG capsule; TAKE 1 CAPSULE(300 MG) BY MOUTH DAILY  Dispense: 90 capsule; Refill: 3  - cyclobenzaprine (FLEXERIL) 10 MG tablet [Pharmacy Med Name: CYCLOBENZAPRINE 10MG TABLETS]; TAKE 1 TABLET(10 MG) BY MOUTH THREE TIMES DAILY AS NEEDED FOR MUSCLE SPASMS   Dispense: 90 tablet; Refill: 0    3. Dysesthesia  - ALPRAZolam (XANAX) 1 MG tablet [Pharmacy Med Name: ALPRAZOLAM 1MG TABLETS]; TAKE 1 TABLET(1 MG) BY MOUTH DAILY  Dispense: 90 tablet; Refill: 0    If protocol passes may refill for 12 months if within 3 months of last provider visit (or a total of 15 months).

## 2021-06-12 NOTE — TELEPHONE ENCOUNTER
Controlled Substance Refill Request  Medication Name:   Requested Prescriptions     Pending Prescriptions Disp Refills     traMADoL (ULTRAM) 50 mg tablet [Pharmacy Med Name: TRAMADOL 50MG TABLETS] 30 tablet 0     Sig: TAKE 1 TABLET(50 MG) BY MOUTH DAILY     Date Last Fill: 9/30/20  Requested Pharmacy: [facility]  Submit electronically to pharmacy  Controlled Substance Agreement on file:   Encounter-Level CSA Scan Date:    There are no encounter-level csa scan date.        Last office visit:  9/8/20

## 2021-06-12 NOTE — PROGRESS NOTES
Layo comes in today for follow-up from his long thoracic nerve palsy surgery.  He underwent a right pectoralis transfer on 4/26 and has been doing water-based physical therapy since.  Unfortunately Layo has not had much progress regarding his shoulder range of motion, and he is only able to achieve about 45  of abduction and 90  of flexion of the shoulder.  He still also has pain in the lateral aspect of the shoulder as well as radiation of the pain down of the arm in to the elbow.  He is seeing his surgeon on a regular basis and has an appointment in a couple of weeks with him.  Apparently he is planning on doing a corticosteroid injection of something but Layo is not sure exactly what.  He is currently taking tramadol, Flexeril, Xanax, and naproxen at night which is helping him sleep and helps his pain, but does not take anything during the day.  He is wondering if he can get a refill on his tramadol which I think would be appropriate.  He is also looking to do land-based therapy as instructed by his surgeon.    Objective: Vital signs are as per the EMR.  In general patient is alert pleasant and in no acute distress.  He appears healthy.    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.  This is a fairly normal surgery that I do not have any experience with.  However it appears that Layo's body is in a period of re-training right now in respect to his scapulothoracic motion.  I reassured him that I did not see anything concerning regarding his recovery.  I placed a referral for land-based therapy and refilled his tramadol.  He will follow-up as needed.

## 2021-06-13 NOTE — TELEPHONE ENCOUNTER
REFILL REQUEST:    Cyclobenzaprine 10mg tablets  LR:  11/01/20  LOV: 09/08/20      Mary GARZA CMA

## 2021-06-13 NOTE — TELEPHONE ENCOUNTER
Controlled Substance Refill Request  Medication Name:   Requested Prescriptions     Pending Prescriptions Disp Refills     traMADoL (ULTRAM) 50 mg tablet 30 tablet 0     Sig: Take 1 tablet (50 mg total) by mouth daily.     ALPRAZolam (XANAX) 1 MG tablet 90 tablet 0     Date Last Fill: 11/4/20  Requested Pharmacy: [facility] # [id number]  Submit electronically to pharmacy  Controlled Substance Agreement on file:   Encounter-Level CSA Scan Date:    There are no encounter-level csa scan date.        Last office visit:  9/8/20

## 2021-06-13 NOTE — PROGRESS NOTES
Layo comes in today for follow-up of his right scapular surgery.  He has been doing physical therapy very faithfully and has greatly increased his range of motion to both flexion in the vertical plane as well as the scapular plane.  However he is only able to get up to about 90  of abduction.  His surgeon at the [facility] is looking to do a capsular release as he diagnosed Layo with adhesive capsulitis.  This is going to take place in approximately 5-6 weeks.  Otherwise Layo is happy with his results and just wanted to check in with me.    Objective: Vital signs are as per the EMR.  In general patient is alert pleasant and in no acute distress.  He appears healthy.    Assessment and plan: Scapular winging status post scapular stabilization surgery.  He has developed adhesive capsulitis and is going to undergo an arthroscopic capsule release in early December.  He will follow-up as needed.

## 2021-06-14 NOTE — TELEPHONE ENCOUNTER
Patient called and wants to know if he needs a prior authorization for his medications because of needing one in the past. He did not specify which medication it was, as he told me he did not know which one usually needs a prior auth. He stated he does need refills and if a prior auth is required, he would like that taken care of. Please advise. Thank you!

## 2021-06-14 NOTE — TELEPHONE ENCOUNTER
Central PA team  [phone]  Pool: HE PA MED ([id number])          PA has been initiated.       PA form completed and faxed insurance via Cover My Meds     Key:  SRINIVAS VELÁZQUEZ (Chilel: [id number])      Medication:  Tramadol 50 mg    Insurance:  [employer] [place] Medicaid        Response will be received via fax and may take up to 5-10 business days depending on plan

## 2021-06-14 NOTE — TELEPHONE ENCOUNTER
Patient called back, he is needing the following rx filled:    traMADoL (ULTRAM) 50 mg tablet    Amieantonia Martinezchen -[phone]      Patient informed if Pharmacy notifies clinic that prior authorization is needed we will work on obtaining it at that time.

## 2021-06-14 NOTE — TELEPHONE ENCOUNTER
RN cannot approve Refill Request    RN can NOT refill this medication med is not covered by policy/route to provider. Last office visit: 10/15/2019 Maurice Kidd MD Last Physical: Visit date not found Last MTM visit: Visit date not found Last visit same specialty: 9/8/2020 Lan Murry MD.  Next visit within 3 mo: Visit date not found  Next physical within 3 mo: Visit date not found      Nella Mendoza, [facility] Triage/Med Refill 1/2/2021    Requested Prescriptions   Pending Prescriptions Disp Refills     naproxen (NAPROSYN) 500 MG tablet [Pharmacy Med Name: NAPROXEN 500MG TABLETS] 180 tablet 3     Sig: TAKE 1 TABLET(500 MG) BY MOUTH TWICE DAILY AS NEEDED       There is no refill protocol information for this order

## 2021-06-14 NOTE — TELEPHONE ENCOUNTER
Pt called, Tramadol is the medication that needs a prior auth. Please work on PA as he will be out of meds tomorrow. Pharm sent in a PA request today.

## 2021-06-14 NOTE — TELEPHONE ENCOUNTER
LMTCB. We need to know which meds pt is needing refilled. We will not know which meds need a PA until they are sent to the pharmacy, and the pharmacy runs them through his insurance. If a PA is needed, the pharmacy will contact us.

## 2021-06-14 NOTE — PROGRESS NOTES
"ASSESSMENT and PLAN:  1. OE  I gave him cortisporin ear gtts to use.  I asked him to resume his nasal spray.      2. Asymmetric hearing loss  When his pain resolves, I've asked him to schedule w/ audiology and ENT      Medications Discontinued During This Encounter   Medication Reason     fluticasone (FLONASE) 50 mcg/actuation nasal spray Reorder       No Follow-up on file.    Patient Instructions   I sent in ear drops to help the pain in your left ear.    Resume your nasal spray.    Please schedule a hearing test with audiology and then an appt with ENT:  [phone]    Take care!      CHIEF COMPLAINT:  Chief Complaint   Patient presents with     Ear Pain     Left ear pain and ringing in ear      Dizziness     off and on x 1 week       HISTORY OF PRESENT ILLNESS:  Kwasi Silverman is a 47 y.o. male  presenting to the clinic today for ringing in his ear and left ear pain.  His sx started one week ago.  It's gotten progressively worse.  He was listening to a presentation yesterday.  He had a 3-5 sec room spinning sensation and felt dizzy.  He had ringing in his ear.  He has vertigo at night.  He also noted that he's been only able to use a phone on his right ear; that's been a longer standing issue.  He just assumed he had a \"bad\" ear and a \"good\" ear.  He had several ear infections as a child.    He has purulent nasal d/c when he blows his nose.    He swims 4x/week.  He does not wear an ear cap.  He last went swimming on Sunday.    REVIEW OF SYSTEMS:    All other systems are negative.    PFSH:  Family History   Problem Relation Age of Onset     Cancer Mother      breast     Urolithiasis Sister      Clotting disorder Neg Hx      Diabetes Neg Hx      Gout Neg Hx      Heart disease Neg Hx        TOBACCO USE:  History   Smoking Status     Never Smoker   Smokeless Tobacco     Not on file       VITALS:  Vitals:    12/07/17 1048   BP: 104/60   Patient Site: Right Arm   Pulse: 64   Temp: 97.6  F (36.4  C)   TempSrc: Oral "     Wt Readings from Last 3 Encounters:   05/05/17 182 lb (82.6 kg)   09/14/16 182 lb (82.6 kg)   05/06/16 182 lb 9.6 oz (82.8 kg)         PHYSICAL EXAM:  Constitutional:  Reveals an alert, pleasant adult male.   Vitals:  Noted.   Ears: no mastoid tenderness, left external ear is not swollen or tender, there is white debris present, tender w/ exam, TMs have scarring bilaterally  Throat: cobble stoning presnet  Neck: Normal ROM   Lungs: Clear to auscultation bilaterally, respirations unlabored.     QUALITY MEASURES:  The following high BMI interventions were performed this visit: weight monitoring    MEDICATIONS:  Current Outpatient Prescriptions   Medication Sig Dispense Refill     ALPRAZolam (XANAX) 1 MG tablet TAKE 1 TABLET (1 MG TOTAL) BY MOUTH DAILY. 90 tablet 0     cyclobenzaprine (FLEXERIL) 10 MG tablet TAKE 1 TABLET (10 MG TOTAL) BY MOUTH 3 (THREE) TIMES A DAY AS NEEDED FOR MUSCLE SPASMS. 90 tablet 1     cyclobenzaprine (FLEXERIL) 10 MG tablet TAKE 1 TABLET (10 MG TOTAL) BY MOUTH 3 (THREE) TIMES A DAY AS NEEDED FOR MUSCLE SPASMS. 90 tablet 1     dexamethasone (DECADRON) 4 mg/mL injection FOR USE DURING PT SESSION - IONTOPHORESIS  0     DULoxetine (CYMBALTA) 20 MG capsule Take 2 capsules by mouth once daily.       DULoxetine (CYMBALTA) 30 MG capsule Take 30 mg by mouth 2 (two) times a day.       fluticasone (FLONASE) 50 mcg/actuation nasal spray INSTILL 1 SRAY INTO EACH NOSTRIL 2 TIMES DAILY 16 g 3     lidocaine (LIDODERM) 5 % APPLY 1-3 PATCHES TO AFFECTED AREAS AS NEEDED, AS DIRECTED. REMOVE AFTER 12 HOURS 30 patch 3     lidocaine (XYLOCAINE) 5 % ointment Apply topical to affected areas twice daily as needed for pain. 35.44 g 3     naproxen (NAPROSYN) 500 MG tablet TAKE 1 TABLET BY MOUTH TWICE A DAY AS NEEDED 180 tablet 1     omeprazole (PRILOSEC) 20 MG capsule TAKE ONE CAPSULE BY MOUTH EVERY DAY 90 capsule 3     oxyCODONE (ROXICODONE) 5 MG immediate release tablet Take 1 tablet (5 mg total) by mouth every 4  (four) hours as needed for pain. 15 tablet 0     SUMAtriptan (IMITREX) 50 MG tablet Take 1 tablet (50 mg total) by mouth as needed for migraine (may repeat every 2 hours.  max 200MG/day). 10 tablet 11     tamsulosin (FLOMAX) 0.4 mg Cp24 Take 1 capsule (0.4 mg total) by mouth daily. 5 capsule 0     traMADol (ULTRAM) 50 mg tablet Take 1 tablet (50 mg total) by mouth every 6 (six) hours as needed for pain. 60 tablet 0     zolpidem (AMBIEN) 10 mg tablet TAKE 0.5-1 TABLETS (5-10 MG TOTAL) BY MOUTH AT BEDTIME AS NEEDED FOR SLEEP. 20 tablet 0     neomycin-polymyxin-hydrocortisone (CORTISPORIN) otic solution Administer 3 drops into the left ear 3 (three) times a day for 10 days. 10 mL 0     No current facility-administered medications for this visit.

## 2021-06-14 NOTE — TELEPHONE ENCOUNTER
Reason for Call:  Medication or medication refill:    Do you use a [facility]?  Name of the pharmacy and phone number for the current request: [facility]  [phone]    Name of the medication requested:  traMADoL (ULTRAM) 50 mg tablet  cyclobenzaprine (FLEXERIL) 10 MG tablet    Out of Tramadol and only have a few of cyclobenzprine left    Can we leave a detailed message on this number? Yes    Phone number patient can be reached at:   Cell number on file:    Telephone Information:   Mobile [phone]       Best Time: any    Call taken on 2/3/2021 at 9:47 AM by Laura L Goldberg

## 2021-06-14 NOTE — TELEPHONE ENCOUNTER
Orders pended.    Last OV 9/8/2020.  No future appointments.    Michelle Sommer CMA ............... 1:59 PM, 02/03/21

## 2021-06-15 NOTE — TELEPHONE ENCOUNTER
Faxed additional medical records to [facility] Fax# [phone].  Needed documentation for care provided in 2019/2020 as patient did not complete treatment during initial authorization period.

## 2021-06-15 NOTE — TELEPHONE ENCOUNTER
Reason for Call:  Medication or medication refill:    Do you use a [facility]?  Name of the pharmacy and phone number for the current request: [facility]     Name of the medication requested:   ALPRAZolam   traMADoL    Other request: n/a    Can we leave a detailed message on this number? Yes    Phone number patient can be reached at: [phone]    Best Time: anytime     Call taken on 2/26/2021 at 11:49 AM by Nancy Martinez

## 2021-06-15 NOTE — TELEPHONE ENCOUNTER
Refill Request  Medication name:   Medication name:   Requested Prescriptions             Pending Prescriptions Disp Refills     cyclobenzaprine (FLEXERIL) 10 MG tablet 90 tablet 0       Sig: Take 1 tablet (10 mg total) by mouth 3 (three) times a day as needed for muscle spasms.       Who prescribed the medication: PCP  Last refill on medication: 2/3/21  Requested Pharmacy[facility]  Last appointment with PCP: 9/8/20  Next appointment: None scheduled, f/u plan was not made. Stable labs in 9/2020.    Freya Daniels, CMA

## 2021-06-15 NOTE — TELEPHONE ENCOUNTER
Request to Dr Murry to write appeal letter to [facility].  Called and updated Layo of status.

## 2021-06-15 NOTE — PROGRESS NOTES
"HPI: This patient is a 46yo M who presents for evaluation of the throat at the request of Dr. Murry. He has several issues including some bilateral, non-pulsatile tinnitus, globus sensation, sinus issues, and fatigue. The most concerning for him on this list is the globus sensation that has been present for several months. This has been accompanied with occasional dry cough, frequent throat clearing, and a drop in his vocal register. He also has frequent \"sinus\" issues, described as facial pressure and some congestion, but no fevers, weight loss, odynophagia, dysphagia, hemoptysis, and shortness of breath. He has complained of sour taste, heartburn, and burping in the past and has been taking reflux medication for quite some time. He deals with some anxiety issues and reports feeling foggy a lot of the time. He had a UPPP for RENA about 20yrs ago; he does not remember the severity of his RENA at that time and reports no PSG since.    Past medical history, surgical history, social history, family history, medications, and allergies have been reviewed with the patient and are documented above.    Review of Systems: a 10-system review was performed. Pertinent positives are noted in the HPI and on a separate scanned document in the chart.    PHYSICAL EXAMINATION:  GEN: no acute distress, normocephalic  EYES: extraocular movements are intact, pupils are equal and round. Sclera clear.   EARS: auricles are normally formed. The external auditory canals are clear with minimal to no cerumen. Tympanic membranes are intact bilaterally with no signs of infection, effusion, retractions, or perforations.  NOSE: anterior nares are patent. There are no masses or lesions. The septum is non-obstructing.  OC/OP: clear, dentition is in good repair. The tongue and palate are fully mobile and symmetric, s/p UPPP. The floor of mouth, base of tongue are symmetric. Cobblestoning of the posterior pharyngeal wall.  HP/L (scope): nasopharynx, " base of tongue, vallecula, epiglottis, and pyriform sinuses are clear. The bilateral vocal folds are mobile and without lesion. There is interarytenoid edema and erythema.  NECK: soft and supple. No lymphadenopathy or masses. Airway is midline. + bilateral TMJ tenderness  NEURO: CN II-XII are intact bilaterally. alert and oriented x 3. No nystagmus. Gait is normal.  PULM: breathing comfortably on room air, normal chest expansion with respiration    MEDICAL DECISION-MAKING: This patient is a 46yo M with several chronic issues.    1. Cronic laryngitis/globus sensation from GERD: Discussed diet and lifestyle changes, in addition to risks and benefits of H2 blocker vs PPI therapy. Also will refer to GI for further management.  2. TMJ: His facial pressure/pain/headache is likely secondary to TMD issues. Will [facility] for further management   3. Hearing: essentially normal. Tinnitus most likely secondary to TMJ.  4. Hx of RENA s/p UPPP 20yrs ago: He did not have post-treatment sleep study that he remembers. Unsure if he still has RENA. He does not think so. Mental fogginess and fatigue may be due to TMJ, RENA, or both. Will consider repeat PSG if this becomes a more pervasive symptom.

## 2021-06-15 NOTE — TELEPHONE ENCOUNTER
Naima from [facility] needing to verify which shoulder the request is for. Previous request in 2019 was for the opposite shoulder.  also needs to confirm which surgery is being requested. In 2019 an request was sent for tendon transfer though the surgery was never completed. The current request for 2021 pectoralis minor release is a different surgery request.

## 2021-06-15 NOTE — TELEPHONE ENCOUNTER
I don't know of anyone in the area that does the specific procedure Layo had.  I would recommend that he call the [facility] and see if anyone there does the procedure that Dr. Figueroa did

## 2021-06-15 NOTE — PROGRESS NOTES
Hearing evaluation in conjunction with ENT exam (Dr. Mcgowan)    History:  Bilateral hearing loss, worse in the left ear than in the right, for approximately one year. He endorsed bilateral aural fullness, and the need to ask for repetition frequently in conversation (uses speakerphone exclusively; otherwise, cannot hear on the telephone using either ear). Tinnitus occurs each night at 7:30 and is beginning to disrupt his evening activities and sleep patterns. He reported being ill for approximately two months, with night sweats, throat/voice issues, and dizziness. Denied nausea, vomiting, recent fever, or otalgia. Mr. Silverman was last tested at this clinic in 2015, with essentially normal findings, bilaterally.    Results:  Otoscopy was unremarkable in either ear. Hearing sensitivity was assessed with good reliability using circumaural phones.      Normal hearing sensitivity, bilaterally, for 250-8000 Hz.    Speech reception thresholds showed agreement with pure tone findings in each ear. Word recognition ability was excellent, bilaterally, with presentation levels at typical conversational volume in both ears.    Tympanometry was consistent with normal middle ear function, bilaterally (hypermobile tracing was obtained in the left ear).    All results were stable, bilaterally, per 3-19-15 audiogram.    Recommendations:  Follow-up with ENT; retest hearing per medical management.  Wear hearing protection consistently in noise. Common tinnitus triggers and management strategies were discussed at length.    Aleyda Schofield, [facility]  [place] Licensed Audiologist [id number]

## 2021-06-15 NOTE — TELEPHONE ENCOUNTER
Patient found out that preferred provider, Dr. Daniel Figueroa is now at [facility]. Patient would like to know what his next steps are or if there is a recommended provider from PCP.     Currently scheduled on 3/16/2021 at [facility] with Dr. Mcdowell. Ph: [phone]. Concerns that this provider may look at his shoulder injury and state they are not able to help, which has been the ongoing issue with scheduling his surgery.    Ashley was also notified as she was working with [employer] to try and obtain an approval.

## 2021-06-15 NOTE — TELEPHONE ENCOUNTER
[facility] denied   Denial Reason:  Not Medically Necessary.  We received and looked at your medical information.  It did not support that this care is need to treat your condition.      Called and left message for [facility] to return all.  Patient was approved for this surgery previously in 2020 and this request is to re approve because his surgery was cancelled due to COVID.

## 2021-06-16 PROBLEM — M95.8 WINGED SCAPULA: Status: ACTIVE | Noted: 2017-08-04

## 2021-06-16 PROBLEM — K21.9 GASTROESOPHAGEAL REFLUX DISEASE WITHOUT ESOPHAGITIS: Status: ACTIVE | Noted: 2021-05-10

## 2021-06-16 PROBLEM — K22.5 ZENKER'S DIVERTICULUM: Status: ACTIVE | Noted: 2019-11-22

## 2021-06-16 PROBLEM — K52.831 COLLAGENOUS COLITIS: Status: ACTIVE | Noted: 2019-02-05

## 2021-06-16 NOTE — TELEPHONE ENCOUNTER
Reason for Call:  Other      Detailed comments:  Calling regarding pain plan for after shoulder surgery  Esperanza VELEZ @ [facility]  [phone]      Call taken on 3/17/2021 at 2:32 PM by Laura L Goldberg

## 2021-06-16 NOTE — TELEPHONE ENCOUNTER
I tried to reach ROSIE Mata but I was told that she wasn't available to take my call. She will call back. When she returns this call, please inform her of the below clinician response. Thanks.

## 2021-06-16 NOTE — PROGRESS NOTES
Clinic Care Coordination Contact  [facility]/Voicemail    Clinical Data: Care Coordinator Outreach  Outreach attempted x 1.  Left message on patient's voicemail with call back information and requested return call.  Plan: Care Coordinator will try to reach patient again in 1-2 business days.

## 2021-06-16 NOTE — TELEPHONE ENCOUNTER
Telephone Encounter by Truman Fung at 2/18/2020  4:30 PM     Author: Truman Fung Service: -- Author Type: --    Filed: 2/18/2020  4:33 PM Encounter Date: 2/16/2020 Status: Signed    : Truman Fung       PRIOR AUTHORIZATION NOT NEEDED    Per fax received from [employer], the patient's insurance requires trial and failure of 2 preferred products. If the provider does not want to change to one of the preferred medications, please provide [facility] medical reasoning as why the patient's therapy can be changed. The preferred medications are: Nexium Suspension, Omeprazole, Pantoprazole.

## 2021-06-16 NOTE — TELEPHONE ENCOUNTER
EDWIN called back relayed message from provider.  They will institute the protocol per Dr Malin x6 weeks and then have Layo discuss with Dr Lan Murry any further care.

## 2021-06-16 NOTE — TELEPHONE ENCOUNTER
Telephone Encounter by Francy Kimble LPN at 2/20/2020 11:14 AM     Author: Francy Kimble LPN Service: -- Author Type: Licensed Nurse    Filed: 2/20/2020 11:15 AM Encounter Date: 2/16/2020 Status: Signed    : Francy Kimble LPN (Licensed Nurse)       Patient Returning Call  Reason for call:  Patient returning call   Information relayed to patient:  Lan Murry MD   to Freya Hartmann CMA ? Lan Murry ([facility]) Care Team Pool         10:40 AM   Note      Looks like his insurance won't cover the lansoprazole.  I'd advise him just to pay cash for it using [employer].  Looks like he can get a 90 day supply for abotu 30 bucks      Patient has additional questions:  Yes  If YES, what are your questions/concerns:  Patient states he is on disability he cannot afford 30 $paing from his pocket requesting provider to send PA .     Okay to leave a detailed message?: No

## 2021-06-16 NOTE — TELEPHONE ENCOUNTER
Telephone Encounter by Malou Zimmer at 1/6/2020  8:18 AM     Author: Malou Zimmer Service: -- Author Type: --    Filed: 1/6/2020  8:18 AM Encounter Date: 1/3/2020 Status: Signed    : Malou Zimmer APPROVED:    Approval start date: 12/04/209  Approval end date:  01/02/2021    Pharmacy has been notified of approval and will contact patient when medication is ready for pickup.

## 2021-06-16 NOTE — TELEPHONE ENCOUNTER
Telephone Encounter by Truman Fung at 2/24/2020 11:14 AM     Author: Truman Fung Service: -- Author Type: --    Filed: 2/24/2020 11:21 AM Encounter Date: 2/16/2020 Status: Signed    : Truman Fung APPROVED:    Approval start date: 01/22/2020  Approval end date:  02/22/2021    Pharmacy has been notified of approval and will contact patient when medication is ready for pickup.

## 2021-06-16 NOTE — TELEPHONE ENCOUNTER
Called Dr Mcdowell's office for more detail. They are wanting to set Layo up for shoulder surgery but no surgery date has been set up yet. They would like to know if you would agree to Dr Mcdowell's after surgery medication plan for Layo. Dr Mcdowell generally gives pt's that have shoulder surgery Oxycodone to take 1-2 tabs every 4  hours, Hydroxyzine 25mg 1-2 tablets every 8 hours, Ibuprofen 800mg 1 tablet every 8 hours and Gabapentin 100mg 3 tablets at bedtime for 10 days. They would ask if the pt is okay to be off Alprazolam. Does this plan work for you? Thanks.

## 2021-06-16 NOTE — PROGRESS NOTES
Assessment & Plan   Problem List Items Addressed This Visit     None      Visit Diagnoses     Drug-induced acute pancreatitis, unspecified complication status    -  Primary    Relevant Orders    Lipase               #1.  Hospital follow-up for pancreatitis thought secondary to Bactrim.  This was placed on his allergy list.  His symptoms are completely resolved at this point.  If he has any recurrence of his symptoms, however, we may need to refer him to gastroenterology for further work-up of more uncommon causes, including autoimmune pancreatitis, etc.  Otherwise follow-up with us as needed.  Check a lipase.      No follow-ups on file.    Lan Murry MD  [facility]   Kwasi Silverman is 51 y.o. and presents today for the following health issues     Layo comes in today for follow-up of a hospitalization for pancreatitis.  He was hospitalized from 3/26-3/30 after presenting to the emergency room with abdominal pain.  He had an elevated lipase to 1473, at that time as well.  CT scan was unremarkable, however, and showed no pancreatic inflammation.  He was admitted and was started on IV fluids and pain control.  He underwent an abdominal ultrasound which was normal and showed no biliary anatomic abnormalities.  Triglycerides and calcium were normal as well.  Ultimately the etiology of his pancreatitis was thought to be secondary to the Bactrim that he was on for a foot infection.  In review of the literature it appears that pancreatitis has been associated with Bactrim in the past, but the frequency is ultimately not defined.    Currently Layo is feeling better and is having no abdominal pain.  He is eating a full diet without any difficulty.  His foot infection has completely resolved as well.        Objective    /78   Pulse (!) 108   There is no height or weight on file to calculate BMI.  Physical Exam    Abdomen is soft, nontender, nondistended.

## 2021-06-16 NOTE — PROGRESS NOTES
Clinic Care Coordination Contact  Community Health Worker Initial Outreach    Patient accepts CC: No, Patient hung up on CHW before being able to introduce CCC. Patient will be sent Care Coordination introduction letter for future reference.

## 2021-06-16 NOTE — PROGRESS NOTES
Assessment & Plan   Problem List Items Addressed This Visit     None      Visit Diagnoses     Rash and nonspecific skin eruption    -  Primary    Relevant Medications    sulfamethoxazole-trimethoprim (SEPTRA DS) 800-160 mg per tablet           Assessment and plan: Bilateral foot pain and discoloration of unknown etiology.  Given the area of more intense redness and pain he could have conceivably suffered an insect bite causing a secondary cellulitis.  However given the bilateral nature of this it would be quite curious.  A small vessel vasculitis is also on the differential, but would be less likely given its acute nature and lack of accompanying symptoms.  I'm going to start him on Bactrim twice daily and see him back in 3 days for reevaluation.  If the skin gets worse we may need to consider a skin biopsy at that time.  Again reevaluate in 3 days.            No follow-ups on file.    Lan Murry MD  [facility]      Subjective   Kwasi Silverman is 50 y.o. and presents today for the following health issues     Layo comes in today for evaluation of bilateral foot pain and discoloration.  He states that this is been going on for about the last 10 days is.  He states that both feet have turned reddish and purple in color, and he states that he is having a stabbing type of pain in both feet as well.  He states that the pain was getting progressively worse and thus he went into the emergency room last night for evaluation of this.  He had a number of labs drawn at that time.  A Covid test and a influenza antibody were negative.  Renal function and hepatic function were normal.  CRP and sed rate were normal.  D-dimer was slightly elevated 0.81.  CBC was normal.  INR and PTT were normal.  LDH and a urinalysis were normal as well.  He he underwent ABIs which showed no evidence of arterial stenosis.  Venous ultrasound was negative for DVT bilaterally.  He was then told to follow-up  with me today.    Currently states that his pain is about the same as it was yesterday in the emergency room.  He states that the pain gets worse when he walks.  He is feeling well otherwise, denies any fevers, new skin rashes, or significant joint pain.  No known insect bites or other recent infections.        Objective    /72   Pulse 87   SpO2 97%   There is no height or weight on file to calculate BMI.  Physical Exam    Extremities: There is discoloration of both feet bilaterally, up to approximately the ankle.  It is erythematous and violaceous.  There appears to be a very shallow and superficial ulceration of the left great toe, however, there is no evidence of necrosis around the area.  There is an area on the left foot that is slightly raised and very painful and warm to the touch.  The discoloration of the feet is very dependent, however, as it disappears readily when his leg is raised into the air.  The painful and warm area on the left foot, however, does not improve with the leg raise.

## 2021-06-16 NOTE — PROGRESS NOTES
"Clinic Care Coordination Contact    TCM DISCHARGE FOLLOW UP CALL    \"Hi, my name is  Cara, and I am calling from  [facility].  I am calling to follow up and see how things are going for you after your recent hospitalization.      Tell me how you are doing now that you are home?\" I'm ok. Insurance company fighting with me regards to getting pain meds.     **Patient frustrated with his care and not getting pain meds. Patient hung up on CHW, chose to finish conversation without anwsering remaining questions.    Discharge Instructions     Do you understand your discharge instructions?  Pt. Response: -    \"Has an appointment with your primary care provider been scheduled?\"   \"If yes, when is that appointment?    If no, date of appointment scheduled:     Medications    \"Did you have any medication changes?      Do you have any concerns with obtaining the medications or questions about your medications that you would like a RN to review with you?    Call Summary    \"What questions or concerns do you have about your recent visit and your follow-up care?\"              "

## 2021-06-16 NOTE — PROGRESS NOTES
Sent came in today for follow-up of his foot discoloration and pain.  I started him on Bactrim twice per day with a working diagnosis of cellulitis.  He is much improved.  Pain is much less.  His foot discoloration is less as well.  On exam his feet are much less swollen and are back to essentially normal color.  He has an area on the left lateral dorsal aspect of the foot which is erythematous which I think is the nidus of infection.  I explained this will likely improve over time as well.  He will finish out his course of antibiotics.  No charge visit.

## 2021-06-16 NOTE — TELEPHONE ENCOUNTER
RN triage  Call from pt   Pt states seen in clinic last week -- for L foot bite? / infection   Finished septra yesterday and foot not getting better   Still painful and pain to touch -- still red/purple looking - warm to touch - blistery rash on foot - purple lines on foot   No fever -- and swelling some less     Transferred to      Helena Mederos RN [facility] RN triage    COVID 19 Nurse Triage Plan/Patient Instructions    Please be aware that novel coronavirus (COVID-19) may be circulating in the community. If you develop symptoms such as fever, cough, or SOB or if you have concerns about the presence of another infection including coronavirus (COVID-19), please contact your health care provider or visit  [email or web address].    Disposition/Instructions    In-Person Visit with provider recommended. Reference Visit Selection Guide.    Thank you for taking steps to prevent the spread of this virus.  o Limit your contact with others.  o Wear a simple mask to cover your cough.  o Wash your hands well and often.    Resources    [facility]: About COVID-19: [email or web address]    CDC: What to Do If You're Sick: www.cdc.gov/coronavirus/2019-ncov/about/steps-when-sick.html    CDC: Ending Home Isolation: www.cdc.gov/coronavirus/2019-ncov/hcp/disposition-in-home-patients.html     CDC: Caring for Someone: www.cdc.gov/coronavirus/2019-ncov/if-you-are-sick/care-for-someone.html     [facility]: Interim Guidance for Hospital Discharge to Home: www.health.[facility]/diseases/coronavirus/hcp/hospdischarge.pdf    [facility] clinical trials (COVID-19 research studies): clinicalaffairs.[facility]/n-clinical-trials     Below are the COVID-19 hotlines at the [place] Department of Health ([facility]). Interpreters are available.   o For health questions: Call 541-326-1966 or 1-203.833.2136 (7 a.m. to 7 p.m.)  o For questions about schools and childcare: Call 205-971-5945 or 1-404.983.3020  (7 a.m. to 7 p.m.)     Reason for Disposition    [1] Taking antibiotic > 72 hours (3 days) AND [2] cellulitis symptoms are SAME (not getting better)    Additional Information    Negative: SEVERE pain    Negative: [1] SEVERE pain with bending of finger (or toe) AND [2] cellulitis on hand (or foot)    Negative: Fever > 104 F (40 C)    Negative: [1] Widespread rash AND [2] bright red, sunburn-like    Negative: Black (necrotic), dark purple, or blisters develop in area of cellulitis    Negative: Patient sounds very sick or weak to the triager    Negative: [1] Taking antibiotic > 24 hours AND [2] cellulitis symptoms are WORSE (e.g., spreading redness, pain, swelling)    Negative: [1] Finished taking antibiotic AND [2] cellulitis symptoms are WORSE (e.g., redness, pain  drainage, swelling)    Protocols used: CELLULITIS ON ANTIBIOTIC FOLLOW-UP CALL-A-

## 2021-06-17 NOTE — TELEPHONE ENCOUNTER
5-6-21  [facility] in [facility] called stated pt needs the following prescriptions sent to them:  traMADoL (ULTRAM) 50 mg tablet  &  ALPRAZolam (XANAX) 1 MG tablet  They were ordinally called into the [facility] but they needed to be [facility] [place]   Phone: [phone] [address]    [facility] [address]  jayesh

## 2021-06-17 NOTE — TELEPHONE ENCOUNTER
Telephone Encounter by Truman Fung at 1/8/2021 10:12 AM     Author: Truman Fung Service: -- Author Type: Patient Access    Filed: 1/8/2021 10:15 AM Encounter Date: 1/7/2021 Status: Signed    : Truman Fung (Patient Access)       [place] APPROVED:    Approval start date: 12/09/2020  Approval end date:  01/08/2022    Pharmacy has been notified of approval and will contact patient when medication is ready for pickup.

## 2021-06-17 NOTE — PROGRESS NOTES
Assessment & Plan   Problem List Items Addressed This Visit     Gastroesophageal reflux disease without esophagitis    Relevant Medications    pantoprazole (PROTONIX) 20 MG tablet         #1.  Likely uncontrolled acid reflux.  Again he was seen 3 days ago in the emergency room and a CT scan of the abdomen showed no pancreatic inflammation and his lipase was normal as well.  He has been on lansoprazole for a number of years and has also been on Prilosec which he has not tolerated.  Because of this I am going to switch him from lansoprazole to pantoprazole as I believe that this will work on a lot better for him.  If he is not improved in 2 to 3 weeks he should see us back for further evaluation.              No follow-ups on file.    Lan Murry MD  [facility]   Kwasi Silverman is 51 y.o. and presents today for the following health issues     Layo comes in today for follow-up of an ER visit for abdominal pain.  Layo was hospitalized for pancreatitis of unknown etiology 6 weeks ago and has felt fairly good since, except that he has been having some epigastric pain which tends to get worse after eating.  He also complains of some early satiety.  The pain was to the point where he went into the emergency room on 5/7.  At that time a CT scan of the abdomen was normal and his lipase was normal as well.  He was given a GI cocktail which he states helped his pain almost instantaneously.  He was then told to follow-up with me today.  He is moving his bowels on a regular basis.  He is otherwise feeling well.        Objective    /80   Pulse 76   There is no height or weight on file to calculate BMI.  Physical Exam

## 2021-06-17 NOTE — TELEPHONE ENCOUNTER
RN cannot approve Refill Request    RN can NOT refill this medication Sig needed. Last office visit: 4/6/2021 Lan Murry MD Last Physical: Visit date not found Last MTM visit: Visit date not found Last visit same specialty: 4/6/2021 Lan Murry MD.  Next visit within 3 mo: Visit date not found  Next physical within 3 mo: Visit date not found      Nella Mendoza, [facility] Triage/Med Refill 4/26/2021    Requested Prescriptions   Pending Prescriptions Disp Refills     fluticasone propionate (FLONASE) 50 mcg/actuation nasal spray [Pharmacy Med Name: FLUTICASONE 50MCG NASAL SP (120) RX] 48 g 3       Nasal Steroid Refill Protocol Passed - 4/26/2021  5:12 AM        Passed - Patient has had office visit/physical in last 2 years     Last office visit with prescriber/PCP: 4/6/2021 OR same dept: 4/6/2021 Lan Murry MD OR same specialty: 4/6/2021 Lan Murry MD Last physical: Visit date not found Last MTM visit: Visit date not found    Next appt within 3 mo: Visit date not found  Next physical within 3 mo: Visit date not found  Prescriber OR PCP: Lan Murry MD  Last diagnosis associated with med order: 1. Dry mouth  - fluticasone propionate (FLONASE) 50 mcg/actuation nasal spray [Pharmacy Med Name: FLUTICASONE 50MCG NASAL SP (120) RX]; SHAKE LIQUID AND USE 1 SPRAY IN EACH NOSTRIL TWICE DAILY  Dispense: 48 g; Refill: 3     If protocol passes may refill for 12 months if within 3 months of last provider visit (or a total of 15 months).

## 2021-06-17 NOTE — TELEPHONE ENCOUNTER
Reason for Call:  Medication or medication refill:    Do you use a [facility]?  Name of the pharmacy and phone number for the current request: [facility]  [phone]    Name of the medication requested:   ALPRAZolam (XANAX) 1 MG tablet  traMADoL (ULTRAM) 50 mg tablet      Can we leave a detailed message on this number? Yes    Phone number patient can be reached at: Home number on file [phone] (home)    Best Time: any    Call taken on 5/5/2021 at 10:37 AM by Laura L Goldberg

## 2021-06-18 NOTE — PROGRESS NOTES
Layo comes in today for follow-up of ER visit for left sided chest pain and arm numbness.  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  This was performed at the [facility].  He states that things have been going well on the right side, but the symptoms that he had prior to going to the emergency room reminded him of his right-sided symptoms and thus he was concerned that he had some winging on the left.  On exam he was not noted to have much winging on the left.  Labs including a CBC and troponin were negative.  D-dimer was elevated and thus a CT of the chest was performed which was negative for PE.  EKG was unremarkable.  He was given a Medrol Dosepak and was told to follow-up with his surgeon at the [facility].    Layo states that he is having scapular pain along with pain in the area of the LTN.  He has an appointment for an EMG at the [facility] on 6/11.  We spent a significant amount of time discussing his frustrations with his medical conditions and the potential treatments that he may have in the future.    Obj: VSS, afeb.     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  Agree with EMG at this point.  I must say I've never seen a case of this in my career to date as far as a bilateral atraumatic LTN neuropathy.  We'll see what the [facility] has to say.  I told Layo he could send me a [employer] message at any point if he has further questions or he's more than welcome to come in again.    45 minutes were spent with the patient, over half of which was in [facility] care

## 2021-06-19 NOTE — PROGRESS NOTES
ASSESSMENT AND PLAN:  Kwasi Silverman 48 y.o. male is seen here on 07/25/18 for follow-up of polyarthralgias.  He has this going on for the past 5 years.  There does not appear to be clinical, serologic, radiologic evidence of overt arthritis in this patient.  He does not have significant osteoarthritis in the joints that he has noted pain.  There is little to suggest connective tissue disease such as lupus or rheumatoid arthritis or inflammatory arthropathy such as a crystal arthropathy or psoriatic.  I have discussed these reassuring findings.  He may want to consider weaning off some of the medications and is going to work with his primary physician to look into the possibilities there.  Should he have a change in his symptoms, swelling of any of his joints he would return for follow-up.    Diagnoses and all orders for this visit:    Polyarthralgia      HISTORY OF PRESENTING ILLNESS:  Kwasi Silverman, 48 y.o., male is here for follow-up of recent visit for evaluation of of arthralgias.  He narrates that his joint symptoms began many years ago.  He started off with painful knees.  This was 5 years or so ago.  Initially he recalls having had corticosteroid injections which were of limited help.  She is since he is since been on a combination of medications including the naproxen and tramadol.  If he would not to take this combination he would not be able to function because of the.  He also takes Xanax and a and muscle relaxer at bedtime.  He is able to go to bed around 1130 or so waking up in the morning on 6.  He is able to get a good night sleep but without the medication he wonders if he would be.  He has not observed any swelling in the joints.  Over the past 12 months or so he feels that the knee pain has now spread across into his ankles, elbows shoulders.  He has less of discomfort in his hands and feet.  He also has had discomfort in his hip areas.  He rates the symptoms as moderately severe to severe,  "interfering with a variety of day-to-day activities.  In the mornings he noted stiffness that can last up to 10-15 minutes.  He noted no swelling in any of the appendical or joints.  He has gained 15 pounds of weight recently.  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  He has residual impairment range of motion there.  History of dizziness, muscle spasm, anxiety, depression, he is not a smoker does not take alcohol.  He used to work as a  but since his surgery of the shoulders he is not been able to do that.  He exercises regularly until about a year ago he noted that he was able to run and do so but now he is restricted to cycling, swimming is hard to because of the right shoulder pain.  His mom and noted to have psoriasis he does not have psoriasis himself.  There is no family history of lupus ankylosing spondylitis.  Mom noted to have rheumatoid arthritis no family history of ulcerative colitis Crohn's disease.   Further historical information and ADL limitations as noted in the multidimensional health assessment questionnaire attached in the EMR.ALLERGIES:Penicillins and Procaine    PAST MEDICAL/ACTIVE PROBLEMS/MEDICATION/ FAMILY HISTORY/SOCIAL DATA:  The patient has a family history of  Past Medical History:   Diagnosis Date     Anxiety      Kidney stone      Migraine      Winged scapula      History   Smoking Status     Never Smoker   Smokeless Tobacco     Never Used     Patient Active Problem List   Diagnosis     Insomnia     Arthropathy Of Multiple Sites     Generalized Anxiety Disorder     Acrochordon     Bulimia Nervosa     Post-traumatic Stress Disorder     Calculus of kidney     Urinary calculus, unspecified     Winged scapula     Pain medication agreement     Polyarthralgia     Current Outpatient Prescriptions   Medication Sig Dispense Refill     ALPRAZolam " (XANAX) 1 MG tablet Take 1 tablet (1 mg total) by mouth daily. 90 tablet 0     cyclobenzaprine (FLEXERIL) 10 MG tablet TAKE 1 TABLET (10 MG TOTAL) BY MOUTH 3 (THREE) TIMES A DAY AS NEEDED FOR MUSCLE SPASMS. 90 tablet 1     dexamethasone (DECADRON) 4 mg/mL injection FOR USE DURING PT SESSION - IONTOPHORESIS  0     DULoxetine (CYMBALTA) 20 MG capsule Take 2 capsules by mouth once daily.       fluticasone (FLONASE) 50 mcg/actuation nasal spray INSTILL 1 SPRAY IN EACH NOSTRIL TWICE DAILY 48 g 3     lidocaine (LIDODERM) 5 % APPLY 1-3 PATCHES TO AFFECTED AREAS AS NEEDED, AS DIRECTED. REMOVE AFTER 12 HOURS 30 patch 3     lidocaine (XYLOCAINE) 5 % ointment Apply topical to affected areas twice daily as needed for pain. 35.44 g 3     naproxen (NAPROSYN) 500 MG tablet TAKE 1 TABLET(500 MG) BY MOUTH TWICE DAILY AS NEEDED 180 tablet 0     omeprazole (PRILOSEC) 20 MG capsule TAKE ONE CAPSULE BY MOUTH EVERY DAY 90 capsule 3     oxyCODONE (ROXICODONE) 5 MG immediate release tablet Take 1 tablet (5 mg total) by mouth every 4 (four) hours as needed for pain. 15 tablet 0     SUMAtriptan (IMITREX) 50 MG tablet Take 1 tablet (50 mg total) by mouth as needed for migraine (may repeat every 2 hours.  max 200MG/day). 10 tablet 11     tamsulosin (FLOMAX) 0.4 mg Cp24 Take 1 capsule (0.4 mg total) by mouth daily. 5 capsule 0     traMADol (ULTRAM) 50 mg tablet Take 1 tablet (50 mg total) by mouth daily. 30 tablet 0     zolpidem (AMBIEN) 10 mg tablet TAKE 1/2-1 TABLET (5-10 MG TOTAL) BY MOUTH AT BEDTIME AS NEEDED FOR SLEEP. 20 tablet 0     No current facility-administered medications for this visit.      DETAILED EXAMINATION  07/25/18  :  Vitals:    07/25/18 1032   BP: 110/72   Patient Site: Right Arm   Patient Position: Sitting   Cuff Size: Adult Regular   Pulse: 72   Weight: 198 lb (89.8 kg)     Alert oriented. Head including the face is examined for malar rash, heliotropes, scarring, lupus pernio. Eyes examined for redness such as in  episcleritis/scleritis, periorbital lesions.   Neck/ Face examined for parotid gland swelling, range of motion of neck.  Left upper and lower and right upper and lower extremities examined for tenderness, swelling, warmth of the appendicular joints, range of motion, edema, rash.  Some of the important findings included: He does not have synovitis in any of the palpable appendical joints.  He has tenderness and articular as well as nonarticular areas.  He has scar on the right axilla from prior surgery.    LAB / IMAGING DATA:  ALT   Date Value Ref Range Status   12/28/2016 23 0 - 45 U/L Final   02/09/2016 23 0 - 45 U/L Final   03/19/2013 20 <46 IU/L Final     Albumin   Date Value Ref Range Status   12/28/2016 4.0 3.5 - 5.0 g/dL Final   02/09/2016 3.8 3.5 - 5.0 g/dL Final   03/19/2013 4.3 3.5 - 5.0 g/dL Final     Creatinine   Date Value Ref Range Status   05/23/2018 1.30 0.70 - 1.30 mg/dL Final   12/28/2016 1.49 (H) 0.70 - 1.30 mg/dL Final   10/16/2016 1.27 0.70 - 1.30 mg/dL Final       WBC   Date Value Ref Range Status   07/12/2018 8.7 4.0 - 11.0 thou/uL Final   05/23/2018 9.3 4.0 - 11.0 thou/uL Final   07/28/2015 7.4 4.0 - 11.0 thou/uL Final     Hemoglobin   Date Value Ref Range Status   07/12/2018 15.0 14.0 - 18.0 g/dL Final   05/23/2018 14.5 14.0 - 18.0 g/dL Final   12/28/2016 14.2 14.0 - 18.0 g/dL Final     Platelets   Date Value Ref Range Status   07/12/2018 284 140 - 440 thou/uL Final   05/23/2018 327 140 - 440 thou/uL Final   12/28/2016 246 140 - 440 thou/uL Final       Lab Results   Component Value Date    RF <15.0 07/12/2018    SEDRATE 2 07/12/2018

## 2021-06-19 NOTE — PROGRESS NOTES
ASSESSMENT and PLAN:  1. Arthropathy Of Multiple Sites  He's wondering if he may have an autoimmune form of arthritis.  He's also interested in non-opioid management if that would help him.    - traMADol (ULTRAM) 50 mg tablet; Take 1 tablet (50 mg total) by mouth daily.  Dispense: 30 tablet; Refill: 0  - Erythrocyte Sedimentation Rate  - C-Reactive Protein  - Antinuclear Antibodies Screen (JOSE G)  - Rheumatoid Factor Quant  - CCP Antibodies  - HM2(CBC w/o Differential)  - Ambulatory referral to Rheumatology    2. TMJ (temporomandibular joint syndrome)  I think he'd benefit from evaluation and treament by a TMJ pain specialist.  I made a referral.  - Ambulatory referral to [facility]    3. Left knee pain  We'll get knee films and he'll be seeing rheum.  - XR Knee Bilateral Plus [place] [facility]; Future  - Ambulatory referral to Rheumatology    4. Right knee pain  See #3 above  - XR Knee Bilateral Plus [place] [facility]; Future  - Ambulatory referral to Rheumatology    5. Chronic, continuous use of opioids  Discussed in detail.  He agrees to our contract.  - Drugs of Abuse 1,Urine (Amphetamines, Benzodiazepines, Opiates, PCP, THC, Barbiturates, Cocaine, Oxycodone)    6. Controlled substance agreement signed  signed    7. Pain medication agreement  added      Medications Discontinued During This Encounter   Medication Reason     traMADol (ULTRAM) 50 mg tablet Therapy completed     traMADol (ULTRAM) 50 mg tablet Reorder     cyclobenzaprine (FLEXERIL) 10 MG tablet      DULoxetine (CYMBALTA) 30 MG capsule        Return in 3 months (on 10/12/2018) for Pain Management.    Patient Instructions   Labs and x-ray today.      Today's labs will be available on [facility].  If you aren't signed up, then we'll mail them out.  If anything needs more immediate follow up, we'll also call.    After you have your labs and xrays, we'll have you meet with one of our specialty schedulers to get set up for the TMJ clinic and rheumatology.    [facility]  Treatment Agreement for Chronic Pain Management    Narcotic pain medicine may or may not be prescribed at your first or subsequent visits to [facility].  Narcotic pain medications are only one part of your treatment plan. Your treatment modalities may include holistic approaches, psychotherapy, injections, physical therapy and other prescribed or recommended medications. Our main goal is to reduce your pain and increase your daily functional abilities safely and effectively, and we ask that you fully participate in the entire plan of care.      Narcotic pain medications will be carefully evaluated and monitored by your provider.  You will be required to come into the office at designated intervals to discuss the goals  of pain medication therapy. As part of the monitoring process you may expect random  drug screens at any time. This is done routinely for all patients to ensure the best  overall care for each person. Refusal to provide a specimen will result in holding or  discontinuing controlled substances.      Narcotic pain medicines need to be taken exactly as prescribed by your provider. If  you are given medication, you are not to increase the dose or the dosing frequency  without discussing this with your provider. Modifying medications on your own can be  dangerous or fatal. If you think you need to change the dose of your medication or try  a different medication, you will need to discuss this with your provider.      It is important that you keep your narcotic pain medications secure. If the medication  or prescription is lost or misplaced, destroyed, stolen or depleted early, the medication  prescription will not be replaced. You may be requested to bring your medication to  the clinic for a pill count. Refusal may result in holding or discontinuation of the  narcotic pain medication. You may not give or sell the medication to others. Doing so  may endanger that person s health and is against the  law.      You are responsible for your medication and making sure it is filled on time. You will  be given enough medication to last a fixed amount of time. Refills can only be given  during office hours. To avoid interruptions in the medication please request refills at  least 72 hours prior to running out of medication.      You will keep your scheduled appointments with your provider. It is unlikely that  appointments will be granted on a walk-in basis. If you miss appointments or are late,  it may alter your plan of care.      If treated by another provider for acute pain issues, you will make that provider aware  that you are taking narcotic pain medication for chronic pain and that you have a  treatment agreement with [facility]. Inform your provider if you are given a  prescription for any pain medication by another healthcare provider. We ask your  permission to contact other providers and pharmacies about your use of the  Medicines.      Notify your provider if you change pharmacies. You are responsible for getting the  correct number of pills from the pharmacy. You are responsible for informing your  provider of the current address and telephone number of your pharmacy.      The use of alcohol or illegal drugs (e.g. marijuana, non-prescribed narcotic pain  medications, cocaine, methamphetamine, etc.) when taking prescribed narcotic  medications is dangerous to your health, potentially fatal and may increase your pain  level. Illegal drugs are not tolerated when you are being treated with pain medication.  If drug or alcohol use is discovered, this will require a chemical dependency  evaluation and further care will be addressed at a case review.      Any verbal or physically abusive behavior at [facility] or on the phone may be  cause for immediate discharge from the clinic.      Narcotic pain medication may affect your ability to drive or operate machinery. You  assume the risk if you choose to engage in  these activities. State law prohibits driving  or operating dangerous equipment while under the influence of any sedating  medications. You are aware that you could be charged with a crime if it is determined  by law enforcement that you were engaging in these activities under the influence of a  medicine which could cause sedation.      You understand that a copy of this agreement can be shared with other providers  involved in your care and your pharmacy.    Informed Consent for Opioid Use    Chronic pain is a condition that requires treatment approaches which may include  medications, physical therapy, mind-body counseling, anesthetic intervention, and  others. If your doctor has recommended use of opioids to treat your chronic pain, you  should know that some types of pain may not respond to opioids and in some cases  pain may worsen. It is important for you to understand the following:      The goal of opioid use in chronic pain is to improve function and if possible, to provide  pain relief.    There are different types of opioids and different kinds of reactions, depending on the  person.    SIDE EFFECTS:  Common side effects of opioids may include feeling drowsy, constipation, sweating,  itching, and making your thinking cloudy. Side effects may also include effects on the  hormones, including decreased levels of testosterone in men and women, lowering  body temperature, and osteoporosis. There may be changes in mood. Caution with  driving, particularly after dosage changes, and with dangerous machinery is needed.  Uncommon side effects may include some changes with the immune system. Opioids  may worsen sleep apnea. Opioid use may be associated with unexpected death. Your  pain may actually become worse, called Opioid Induced Hyperalgesia.    DRUG INTERACTIONS:  Opioids must be used with caution with alcohol, benzodiazepines (tranquilizers), and  sleeping medications, because these substances can interfere  with the ability to breathe  causing respiratory depression and possibly death. This is a medical emergency, and  should not be allowed to persist until the effects have worn off. CALL 911.    PREGNANCY:  Opioids may affect the health of the fetus and cause complications for the baby after  delivery. Notify your doctor if you become pregnant for appropriate monitoring.    OPIOID ABUSE:  Using opioid for purposes other than pain, such as to  get high  or change mood when  stressed is known as  chemical coping . This is not the goal of therapy and will not be  allowed to continue.    OPIOID DEPENDENCE:  Anyone using opioids long-term will develop a physical dependence and may go  through withdrawal upon stopping. This can include diarrhea and feeling jittery. This is  not life-threatening and may be managed with other medications.    OPIOID ADDICTION:  Addiction is a pattern of abuse of a substance, with loss of control, compulsive use,  craving, and continuing to use despite adverse consequences. It is a neurobiologic  disease requiring treatment. Some people can become addicted to opioids, especially  those people who have developed other substance abuse problems, including nicotine  dependence. If you notice that you are using your medication for symptoms other than  treating your chronic pain, please share this information with your provider. We can  help you. We have a responsibility to help treat addiction. We will routinely ask you  about your medication use, and may ask if you have concerns about addiction. While  these questions may feel uncomfortable, it is being done to ensure your safety. If  addiction is identified we will help you get treatment.  SELLING YOUR MEDICATION OR GIVING IT TO OTHERS:  Opioid medications have a value on the black market and it is not uncommon for people  to pressure you into giving or selling them some of your medication. Your medication is  regulated by the Drug Enforcement  Agency and this is considered a criminal act.    THEFT:  People may want to take your medication from you to get high or sell it. If you have  children in the home, it is possible that one of them may take it accidentally. Keep your  paper prescriptions and your medication in a secure location. If your medicine is stolen,  report this to the police and provide us with a copy of the police report. Once you leave  the clinic, you are responsible for your medication and we will not replace it if stolen.  Here are some tips:    Keep your medicine in a safe or lockbox.    If you need to take some medicine with you for the day, only take the pills that you  need with you instead of the whole bottle.    Don t tell others what medication you take.    DISCONTINUATION:  If a trial of opioids does not improve your function, or other treatments prove effective  and opioids are no longer needed, do not stop your opioids suddenly. We will develop a  tapering schedule. If you are unable to use opioids safely and follow the opioid use  agreement, your opioids may be discontinued, which may involve a tapering schedule  and use of medications for withdrawal, and development of a non-opioid plan of care.    The medical, legislative, and legal guidelines for opioids may change and our  policies surrounding refills, monitoring and travel arrangements may change  accordingly.      [facility] Treatment Agreement for Chronic Pain Management    Narcotic pain medicine may or may not be prescribed at your first or subsequent visits to [facility].  Narcotic pain medications are only one part of your treatment plan. Your treatment modalities may include holistic approaches, psychotherapy, injections, physical therapy and other prescribed or recommended medications. Our main goal is to reduce your pain and increase your daily functional abilities safely and effectively, and we ask that you fully participate in the entire plan of  care.      Narcotic pain medications will be carefully evaluated and monitored by your provider.  You will be required to come into the office at designated intervals to discuss the goals  of pain medication therapy. As part of the monitoring process you may expect random  drug screens at any time. This is done routinely for all patients to ensure the best  overall care for each person. Refusal to provide a specimen will result in holding or  discontinuing controlled substances.      Narcotic pain medicines need to be taken exactly as prescribed by your provider. If  you are given medication, you are not to increase the dose or the dosing frequency  without discussing this with your provider. Modifying medications on your own can be  dangerous or fatal. If you think you need to change the dose of your medication or try  a different medication, you will need to discuss this with your provider.      It is important that you keep your narcotic pain medications secure. If the medication  or prescription is lost or misplaced, destroyed, stolen or depleted early, the medication  prescription will not be replaced. You may be requested to bring your medication to  the clinic for a pill count. Refusal may result in holding or discontinuation of the  narcotic pain medication. You may not give or sell the medication to others. Doing so  may endanger that person s health and is against the law.      You are responsible for your medication and making sure it is filled on time. You will  be given enough medication to last a fixed amount of time. Refills can only be given  during office hours. To avoid interruptions in the medication please request refills at  least 72 hours prior to running out of medication.      You will keep your scheduled appointments with your provider. It is unlikely that  appointments will be granted on a walk-in basis. If you miss appointments or are late,  it may alter your plan of care.      If treated  by another provider for acute pain issues, you will make that provider aware  that you are taking narcotic pain medication for chronic pain and that you have a  treatment agreement with [facility]. Inform your provider if you are given a  prescription for any pain medication by another healthcare provider. We ask your  permission to contact other providers and pharmacies about your use of the  Medicines.      Notify your provider if you change pharmacies. You are responsible for getting the  correct number of pills from the pharmacy. You are responsible for informing your  provider of the current address and telephone number of your pharmacy.      The use of alcohol or illegal drugs (e.g. marijuana, non-prescribed narcotic pain  medications, cocaine, methamphetamine, etc.) when taking prescribed narcotic  medications is dangerous to your health, potentially fatal and may increase your pain  level. Illegal drugs are not tolerated when you are being treated with pain medication.  If drug or alcohol use is discovered, this will require a chemical dependency  evaluation and further care will be addressed at a case review.      Any verbal or physically abusive behavior at [facility] or on the phone may be  cause for immediate discharge from the clinic.      Narcotic pain medication may affect your ability to drive or operate machinery. You  assume the risk if you choose to engage in these activities. State law prohibits driving  or operating dangerous equipment while under the influence of any sedating  medications. You are aware that you could be charged with a crime if it is determined  by law enforcement that you were engaging in these activities under the influence of a  medicine which could cause sedation.      You understand that a copy of this agreement can be shared with other providers  involved in your care and your pharmacy.  SAFETY REMINDERS  No alcohol while taking controlled substances. Alcohol is not an  illegal substance, it is unsafe to use in combination. It is a buildup of substances in the body that can be extremely hazardous and may cause respirations to slow to a dangerous rate resulting in hospitalization, brain damage, or death.     Opioid medications have been associated with sharp rise in unintentional overdose and death. Overdose is a condition characterized by the consumption in excess of a particular drug causing adverse effects. This can happen b/c you are sick, accidentally or intentionally took an extra dose, are on multiple medication that can interact. Someone took your medication and they are not use to the medication.    Symptoms of overdose include:   - breathing slow and shallow, erratic or not at all  - pinpoint pupils, hallucinations  - confusion  - muscle jerks, slack muscles   - extreme sleepiness or loss of alertness   -  awake but not able to talk   - face pale or clammy, vomiting, for lighter skinned people, the skin tone turns bluish purple, for darker skinned people, it turns grayish or ashen     If in a situation where overdose is a concern engage the emergency response system (dial 911).     In one study it was noted that 80% of unintentional overdoses occurred in people who were taking a combination of opioids and benzodiazepines.     Do not sell, loan, borrow or share your opioid medication with anyone. Deaths have occurred as a result of this practice. It is illegal and patients are being prosecuted.      Prevent unexpected access/loss of medication: Keep medication locked. Only carry what you need with you.  Urine Drug Screening/SWAB:    Patient required a random Urine Drug Testing, due to the need to comply with [facility] Model Policy Guidelines and CDC Guideline for the use of any controlled substances. This is to ensure that patient is compliant with treatment, and monitor for risks such as diversion, abuse, or any other aberrant behaviors. Patient is either being considered  for or taking a controlled substance. Unexpected findings will be discussed and treatment decision may be adjusted. Testing is being implemented across the board randomly w/o bias related to age, race, gender, socioeconomic status or [employer] affiliation.     The patient understand todays plan and has their questions answered in regards to expectations and current treatment plan.     The patient and I discussed the potential interactions between alcohol and narcotics.  The patient and I discussed the potential interactions between alcohol and narcotics.      CHIEF COMPLAINT:  Chief Complaint   Patient presents with     Follow-up     medication, knee and jaw questions       HISTORY OF PRESENT ILLNESS:  Kwasi Silverman is a 48 y.o. male  presenting to the clinic today for a discussion about pain management.  He has some on-going pain in his right arm as well as numbness.  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  Her's also having pain in his ankles, knees, hips laterally and in his jaw.  He describes this pain as a constant tooth ache type pain.  His jaw pain is getting so severe when he eats, he's having to cut his food into small pieces and be careful about how much he puts in his mouth at one time.    In addition to pain, he struggles w/ anxiety and difficulty w/ sleep.  When these three aren't under control, he tends to spiral.  He gets depressed, eats his feelings and then gets more down, etc.    For management, he's found the following combination to be most effective for him:  1/2 naproxyn, 1 flexeril, 1 alprazolam and 1 tramadol.  He's been doing this at bedtime for over a year.  He hasn't required increased dosing.  He never combines this with alcohol.  He never feels over sedate.  He is aware of the potential interactions and additive effect of taking this meds close together.  We had a long discussion about the risks/benefits.  We reviewed our opioid  agreement.  He is motivated to be compliant.      REVIEW OF SYSTEMS:    All other systems are negative.    PFSH:  Family History   Problem Relation Age of Onset     Cancer Mother      breast     Urolithiasis Sister      Clotting disorder Neg Hx      Diabetes Neg Hx      Gout Neg Hx      Heart disease Neg Hx        TOBACCO USE:  History   Smoking Status     Never Smoker   Smokeless Tobacco     Never Used       VITALS:  Vitals:    07/12/18 1050   BP: 114/66   Pulse: 82   Resp: 16   Temp: 98.1  F (36.7  C)   SpO2: 96%     Wt Readings from Last 3 Encounters:   05/23/18 185 lb (83.9 kg)   05/05/17 182 lb (82.6 kg)   09/14/16 182 lb (82.6 kg)     PHYSICAL EXAM:  Constitutional:  Reveals an alert, pleasant adult male.   Vitals:  Noted.   Jaw: clicking and clunk sensation at tmjs bilaterally w/ open/closing mouth    MEDICATIONS:  Current Outpatient Prescriptions   Medication Sig Dispense Refill     ALPRAZolam (XANAX) 1 MG tablet Take 1 tablet (1 mg total) by mouth daily. 90 tablet 0     cyclobenzaprine (FLEXERIL) 10 MG tablet TAKE 1 TABLET (10 MG TOTAL) BY MOUTH 3 (THREE) TIMES A DAY AS NEEDED FOR MUSCLE SPASMS. 90 tablet 1     dexamethasone (DECADRON) 4 mg/mL injection FOR USE DURING PT SESSION - IONTOPHORESIS  0     DULoxetine (CYMBALTA) 20 MG capsule Take 2 capsules by mouth once daily.       fluticasone (FLONASE) 50 mcg/actuation nasal spray INSTILL 1 SPRAY IN EACH NOSTRIL TWICE DAILY 48 g 3     lidocaine (LIDODERM) 5 % APPLY 1-3 PATCHES TO AFFECTED AREAS AS NEEDED, AS DIRECTED. REMOVE AFTER 12 HOURS 30 patch 3     lidocaine (XYLOCAINE) 5 % ointment Apply topical to affected areas twice daily as needed for pain. 35.44 g 3     naproxen (NAPROSYN) 500 MG tablet TAKE 1 TABLET(500 MG) BY MOUTH TWICE DAILY AS NEEDED 180 tablet 0     omeprazole (PRILOSEC) 20 MG capsule TAKE ONE CAPSULE BY MOUTH EVERY DAY 90 capsule 3     oxyCODONE (ROXICODONE) 5 MG immediate release tablet Take 1 tablet (5 mg total) by mouth every 4 (four)  hours as needed for pain. 15 tablet 0     SUMAtriptan (IMITREX) 50 MG tablet Take 1 tablet (50 mg total) by mouth as needed for migraine (may repeat every 2 hours.  max 200MG/day). 10 tablet 11     tamsulosin (FLOMAX) 0.4 mg Cp24 Take 1 capsule (0.4 mg total) by mouth daily. 5 capsule 0     traMADol (ULTRAM) 50 mg tablet Take 1 tablet (50 mg total) by mouth daily. 30 tablet 0     zolpidem (AMBIEN) 10 mg tablet TAKE 1/2-1 TABLET (5-10 MG TOTAL) BY MOUTH AT BEDTIME AS NEEDED FOR SLEEP. 20 tablet 0     No current facility-administered medications for this visit.

## 2021-06-19 NOTE — LETTER
Letter by Lan Murry MD at      Author: Lan Murry MD Service: -- Author Type: --    Filed:  Encounter Date: 7/12/2019 Status: (Other)         [facility] INTERNAL MEDICINE  07/12/19    Patient: Kwasi Silverman  YOB: 1970  Medical Record Number: [id number]                                                                  Opioid / Opioid Plus Controlled Substance Agreement    I understand that my care provider has prescribed an opioid (narcotic) controlled substance to help manage my condition(s). I am taking this medicine to help me function or work. I know this is strong medicine, and that it can cause serious side effects. Opioid medicine can be sedating, addicting and may cause a dependency on the drug. They can affect my ability to drive or think, and cause depression. They need to be taken exactly as prescribed. Combining opioids with certain medicines or chemicals (such as cocaine, sedatives and tranquilizers, sleeping pills, meth) can be dangerous or even fatal. Also, if I stop opioids suddenly, I may have severe withdrawal symptoms. Last, I understand that opioids do not work for all types of pain nor for all patients. If not helpful, I may be asked to stop them.        The risks, benefits, and side effects of these medicine(s) were explained to me. I agree that:    1. I will take part in other treatments as advised by my care team. This may be psychiatry or counseling, physical therapy, behavioral therapy, group treatment or a referral to a pain clinic. I will reduce or stop my medicine when my care team tells me to do so.  2. I will take my medicines as prescribed. I will not change the dose or schedule unless my care team tells me to. There will be no refills if I run out early.  I may be contactedwithout warning and asked to complete a urine drug test or pill count at any time.   3. I will keep all my appointments, and understand this is part of the monitoring  of opioids. My care team may require an office visit for EVERY opioid/controlled substance refill. If I miss appointments or dont follow instructions, my care team may stop my medicine.  4. I will not ask other providers to prescribe controlled substances, and I will not accept controlled substances from other people. If I need another prescribed controlled substance for a new reason, I will tell my care team within 1 business day.  5. I will use one pharmacy to fill all of my controlled substance prescriptions, and it is up to me to make sure that I do not run out of my medicines on weekends or holidays. If my care team is willing to refill my opioid prescription without a visit, I must request refills only during office hours, refills may take up to 3 days to process, and it may take up to 5 to 7 days for my medicine to be mailed and ready at my pharmacy. Prescriptions will not be mailed anywhere except my pharmacy.        [id number]  Rev 12/18         Registration to scan to EHR                             Page 1 of 2               Controlled Substance Agreement Opioid        [facility] INTERNAL MEDICINE  07/12/19  Patient: Kwasi Silverman  YOB: 1970  Medical Record Number: [id number]                                                                  6. I am responsible for my prescriptions. If the medicine/prescription is lost or stolen, it will not be replaced. I also agree not to share controlled substance medicines with anyone.  7. I agree to not use ANY illegal or recreational drugs. This includes marijuana, cocaine, bath salts or other drugs. I agree not to use alcohol unless my care team says I may.          I agree to give urine samples whenever asked. If I dont give a urine sample, the care team may stop my medicine.    8. If I enroll in the [place] Medical Marijuana program, I will tell my care team. I will also sign an agreement to share my medical records with my care team.   9. I  will bring in my list of medicines (or my medicine bottles) each time I come to the clinic.   10. I will tell my care team right away if I become pregnant or have a new medical problem treated outside of my regular clinic.  11. I understand that this medicine can affect my thinking and judgment. It may be unsafe for me to drive, use machinery and do dangerous tasks. I will not do any of these things until I know how the medicine affects me. If my dose changes, I will wait to see how it affects me. I will contact my care team if I have concerns about medicine side effects.    I understand that if I do not follow any of the conditions above, my prescriptions or treatment may be stopped.      I agree that my provider, clinic care team, and pharmacy may work with any city, state or federal law enforcement agency that investigates the misuse, sale, or other diversion of my controlled medicine. I will allow my provider to discuss my care with or share a copy of this agreement with any other treating provider, pharmacy or emergency room where I receive care. I agree to give up (waive) any right of privacy or confidentiality with respect to these consents.     I have read this agreement and have asked questions about anything I did not understand.      ________________________________________________________________________  Patient signature - Date/Time -  Kwasi Silverman                                      ________________________________________________________________________  Witness signature                                                            ________________________________________________________________________  Provider signature - Lan Murry MD      [id number]  Rev 12/18         Registration to scan to EHR                         Page 2 of 2                   Controlled Substance Agreement Opioid           Page 1 of 2  Opioid Pain Medicines (also known as Narcotics)  What You Need to Know    What  are opioids?   Opioids are pain medicines that must be prescribed by a doctor.  They are also known as narcotics.    Examples are:     morphine (MS Contin, Loretta)    oxycodone (Oxycontin)    oxycodone and acetaminophen (Percocet)    hydrocodone and acetaminophen (Vicodin, Norco)     fentanyl patch (Duragesic)     hydromorphone (Dilaudid)     methadone     What do opioids do well?   Opioids are best for short-term pain after a surgery or injury. They also work well for cancer pain. Unlike other pain medicines, they do not cause liver or kidney failure or ulcers. They may help some people with long-lasting (chronic) pain.     What do opioids NOT do well?   Opioids never get rid of pain entirely, and they do not work well for most patients with chronic pain. Opioids do not reduce swelling, one of the causes of pain. They also dont work well for nerve pain.                           For informational purposes only.  Not to replace the advice of your care provider.  Copyright [facility]. All right reserved. [employer] 819702-Mlp 02/18.      Page 2 of 2    Risks and side effects   Talk to your doctor before you start or decide to keep taking one of these medicines. Side effects include:    Lowering your breathing rate enough to cause death    Overdose, including death, especially if taking higher than prescribed doses    Long-term opioid use    Worse depression symptoms; less pleasure in things you usually enjoy    Feeling tired or sluggish    Slower thoughts or cloudy thinking    Being more sensitive to pain over time; pain is harder to control    Trouble sleeping or restless sleep    Changes in hormone levels (for example, less testosterone)    Changes in sex drive or ability to have sex    Constipation    Unsafe driving    Itching and sweating    Feeling dizzy    Nausea, vomiting and dry mouth    What else should I know about opioids?  When someone takes opioids for too long or too often, they become  dependent. This means that if you stop or reduce the medicine too quickly, you will have withdrawal symptoms.    Dependence is not the same as addiction. Addiction is when people keep using a substance that harms their body, their mind or their relations with others. If you have a history of drug or alcohol abuse, taking opioids can cause a relapse.    Over time, opioids dont work as well. Most people will need higher and higher doses. The higher the dose, the more serious the side effects. We dont know the long-term effects of opioids.      Prescribed opioids aren't the best way to manage chronic pain    Other ways to manage pain include:      Ibuprofen or acetaminophen.  You should always try this first.      Treat health problems that may be causing pain.      acupuncture or massage, deep breathing, meditation, visual imagery, aromatherapy.      Use heat or ice at the pain site      Physical therapy and exercise      Stop smoking      See a counselor or therapist                                                  People who have used opioids for a long time may have a lower quality of life, worse depression, higher levels of pain and more visits to doctors.    Never share your opioids with others. Be sure to store opioids in a secure place, locked if possible.Young children can easily swallow them and overdose.     You can overdose on opioids.  Signs of overdose include decrease or loss of consciousness, slowed breathing, trouble waking and blue lips.  If someone is worried about overdose, they should call 911.    If you are at risk for overdose, you may get naloxone (Narcan, a medicine that reverses the effects of opioids.  If you overdose, a friend or family member can give you Narcan while waiting for the ambulance.  They need to know the signs of overdose and how to give Narcan.    While you're taking opioids:    Don't use alcohol or street drugs. Taking them together can cause death.    Don't take any of these  medicines unless your doctor says its okay.  Taking these with opioids can cause death.    Benzodiazepines (such as lorazepam         or diazepam)    Muscle relaxers (such as cyclobenzaprine)    sleeping pills    other opioids    Safe disposal of opioids  Find your area drug take-back program, your pharmacy mail-back program, buy a special disposal bag (such as Deterra) from your pharmacy or flush them down the toilet.  Use the guidelines at:  www.fda.gov/drugs/resourcesforyou

## 2021-06-19 NOTE — PROGRESS NOTES
ASSESSMENT AND PLAN:  Kwasi Silverman 48 y.o. male is seen here on 07/19/18 for evaluation of polyarthralgias ongoing for the past 5 years started off in the knees, and now gradually affecting other major to intermediate joints, sparing the small joints of the hands and feet.  His workup so far is without evidence to suggest that he may have connective tissue disease.  His sed rate CRP, the anti-CCP antibody rheumatoid factor and JOSE G are all negative.  He does not have evidence of synovitis on today's examination, enthesitis, dactylitis.  Hence the characterization of his joint symptoms remains an work which would progress in the near future.  We will start off with workup as noted the hip joint x-rays, other labs.  Will meet here in the next few weeks.  In the interim he is going to continue the current management options.  Diagnoses and all orders for this visit:    Polyarthralgia  -     Anti-Neutrophil Cytoplasmic Antibody, IgG (ANCA IFA)  -     Hepatitis C Antibody (Anti-HCV)  -     XR Pelvis W 2 Vw Hips Bilateral; Future; Expected date: 7/19/18  -     XR Pelvis W 2 Vw Hips Bilateral      HISTORY OF PRESENTING ILLNESS:  Kwasi Silverman, 48 y.o., male is here for evaluation of arthralgias.  He narrates that his joint symptoms began many years ago.  He started off with painful knees.  This was 5 years or so ago.  Initially he recalls having had corticosteroid injections which were of limited help.  She is since he is since been on a combination of medications including the naproxen and tramadol.  If he would not to take this combination he would not be able to function because of the.  He also takes Xanax and a and muscle relaxer at bedtime.  He is able to go to bed around 1130 or so waking up in the morning on 6.  He is able to get a good night sleep but without the medication he wonders if he would be.  He has not observed any swelling in the joints.  Over the past 12 months or so he feels that the knee pain has now  "spread across into his ankles, elbows shoulders.  He has less of discomfort in his hands and feet.  He also has had discomfort in his hip areas.  He rates the symptoms as moderately severe to severe, interfering with a variety of day-to-day activities.  In the mornings he noted stiffness that can last up to 10-15 minutes.  He noted no swelling in any of the appendical or joints.  He has gained 15 pounds of weight recently.  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  He has residual impairment range of motion there.  History of dizziness, muscle spasm, anxiety, depression, he is not a smoker does not take alcohol.  He used to work as a  but since his surgery of the shoulders he is not been able to do that.  He exercises regularly until about a year ago he noted that he was able to run and do so but now he is restricted to cycling, swimming is hard to because of the right shoulder pain.  His mom and noted to have psoriasis he does not have psoriasis himself.  There is no family history of lupus ankylosing spondylitis.  Mom noted to have rheumatoid arthritis no family history of ulcerative colitis Crohn's disease.   Further historical information and ADL limitations as noted in the multidimensional health assessment questionnaire attached in the EMR. Rest of the 13 system ROS is negative.     ALLERGIES:Penicillins and Procaine    PAST MEDICAL/ACTIVE PROBLEMS/MEDICATION/ FAMILY HISTORY/SOCIAL DATA:  The patient has a family history of  Past Medical History:   Diagnosis Date     Anxiety      Kidney stone      Migraine      Winged scapula      History   Smoking Status     Never Smoker   Smokeless Tobacco     Never Used     Patient Active Problem List   Diagnosis     Insomnia     Arthropathy Of Multiple Sites     Generalized Anxiety Disorder     Acrochordon     Bulimia Nervosa "     Post-traumatic Stress Disorder     Calculus of kidney     Urinary calculus, unspecified     Winged scapula     Pain medication agreement     Current Outpatient Prescriptions   Medication Sig Dispense Refill     ALPRAZolam (XANAX) 1 MG tablet Take 1 tablet (1 mg total) by mouth daily. 90 tablet 0     cyclobenzaprine (FLEXERIL) 10 MG tablet TAKE 1 TABLET (10 MG TOTAL) BY MOUTH 3 (THREE) TIMES A DAY AS NEEDED FOR MUSCLE SPASMS. 90 tablet 1     dexamethasone (DECADRON) 4 mg/mL injection FOR USE DURING PT SESSION - IONTOPHORESIS  0     DULoxetine (CYMBALTA) 20 MG capsule Take 2 capsules by mouth once daily.       fluticasone (FLONASE) 50 mcg/actuation nasal spray INSTILL 1 SPRAY IN EACH NOSTRIL TWICE DAILY 48 g 3     lidocaine (LIDODERM) 5 % APPLY 1-3 PATCHES TO AFFECTED AREAS AS NEEDED, AS DIRECTED. REMOVE AFTER 12 HOURS 30 patch 3     lidocaine (XYLOCAINE) 5 % ointment Apply topical to affected areas twice daily as needed for pain. 35.44 g 3     naproxen (NAPROSYN) 500 MG tablet TAKE 1 TABLET(500 MG) BY MOUTH TWICE DAILY AS NEEDED 180 tablet 0     omeprazole (PRILOSEC) 20 MG capsule TAKE ONE CAPSULE BY MOUTH EVERY DAY 90 capsule 3     oxyCODONE (ROXICODONE) 5 MG immediate release tablet Take 1 tablet (5 mg total) by mouth every 4 (four) hours as needed for pain. 15 tablet 0     SUMAtriptan (IMITREX) 50 MG tablet Take 1 tablet (50 mg total) by mouth as needed for migraine (may repeat every 2 hours.  max 200MG/day). 10 tablet 11     tamsulosin (FLOMAX) 0.4 mg Cp24 Take 1 capsule (0.4 mg total) by mouth daily. 5 capsule 0     traMADol (ULTRAM) 50 mg tablet Take 1 tablet (50 mg total) by mouth daily. 30 tablet 0     zolpidem (AMBIEN) 10 mg tablet TAKE 1/2-1 TABLET (5-10 MG TOTAL) BY MOUTH AT BEDTIME AS NEEDED FOR SLEEP. 20 tablet 0     No current facility-administered medications for this visit.        COMPREHENSIVE EXAMINATION:  Vitals:    07/19/18 1207   BP: 104/66   Patient Site: Right Arm   Patient Position:  Sitting   Cuff Size: Adult Regular   Pulse: 68   Weight: 185 lb (83.9 kg)     A well appearing alert oriented male. Vital data as noted above. His eyes without inflammation/scleromalacia. ENTwithout oral mucositis, thrush, nasal deformity, external ear redness, deformity. His neck is without lymphadenopathy and supple. Lungs normal sounds, no pleural rub. Heart auscultation normal rate, rhythm; no pericardial rub and murmurs. Abdomen soft, non tender, no organomegaly. Skin examined for heliotrope, malar area eruption, lupus pernio, periungual erythema, sclerodactyly, papules, erythema nodosum, purpura, nail pitting, onycholysis, and obvious psoriasis lesion. Neurological examination shows normal alertness, speech, facial symmetry, tone and power in upper and lower extremities, Tinel's and Phalen's at wrist and gait. The joint examination is performed for swelling, tenderness, warmth, erythema, and range of motion in the following joints: DIPs, PIPs, MCPs, wrists, first CMC's, elbows, shoulders, hips, knees, ankles, feet; spine for range of motion and paraspinal muscles for tenderness. The salient normal / abnormal findings are appended.  She does not have synovitis in any of the palpable appendicular joints.  He has early Heberden's.  He has generalized tenderness this is in the articular as well as nonarticular areas in upper and lower extremities.  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    LAB / IMAGING DATA:  ALT   Date Value Ref Range Status   12/28/2016 23 0 - 45 U/L Final   02/09/2016 23 0 - 45 U/L Final   03/19/2013 20 <46 IU/L Final     Albumin   Date Value Ref Range Status   12/28/2016 4.0 3.5 - 5.0 g/dL Final   02/09/2016 3.8 3.5 - 5.0 g/dL Final   03/19/2013 4.3 3.5 - 5.0 g/dL Final     Creatinine   Date Value Ref Range Status   05/23/2018  1.30 0.70 - 1.30 mg/dL Final   12/28/2016 1.49 (H) 0.70 - 1.30 mg/dL Final   10/16/2016 1.27 0.70 - 1.30 mg/dL Final       WBC   Date Value Ref Range Status   07/12/2018 8.7 4.0 - 11.0 thou/uL Final   05/23/2018 9.3 4.0 - 11.0 thou/uL Final   07/28/2015 7.4 4.0 - 11.0 thou/uL Final     Hemoglobin   Date Value Ref Range Status   07/12/2018 15.0 14.0 - 18.0 g/dL Final   05/23/2018 14.5 14.0 - 18.0 g/dL Final   12/28/2016 14.2 14.0 - 18.0 g/dL Final     Platelets   Date Value Ref Range Status   07/12/2018 284 140 - 440 thou/uL Final   05/23/2018 327 140 - 440 thou/uL Final   12/28/2016 246 140 - 440 thou/uL Final       Lab Results   Component Value Date    RF <15.0 07/12/2018    SEDRATE 2 07/12/2018

## 2021-06-19 NOTE — LETTER
"Letter by Maurice Kidd MD at      Author: Maurice Kidd MD Service: -- Author Type: --    Filed:  Encounter Date: 10/16/2019 Status: Signed         Kwasi Silverman  [address] [facility] [address]             October 16, 2019         Dear Mr. Silverman,    Below are the results from your recent visit:    Resulted Orders   XR Pelvis AP    Narrative    EXAM: XR PELVIS AP  LOCATION: [facility]  DATE/TIME: 10/15/2019 9:39 AM    INDICATION: Sacrococcygeal disorders, not elsewhere classified  COMPARISON: None.      Impression    Negative pelvis. No fracture, dislocation or significant degenerative change.      \"Enclosed is the report of your hip and pelvis x-ray.  The radiologist read it as normal.\"        Please call with questions or contact us using [employer].    Sincerely,        Electronically signed by Maurice Kidd MD       "

## 2021-06-19 NOTE — PROGRESS NOTES
Layo comes in today for a couple of different issues.  He is considering having surgery on his right scapula as it is beginning to wane.  Apparently he had an evaluation at the [facility] which revealed only 40% function of his right long thoracic nerve.  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  He is considering long-term disability and wanted me to fill out paperwork for this today.  He is also been having some left-sided SI joint pain for about the last 2-3 weeks.  It is starting to limit his day-to-day activities and is wondering if an injection would benefit him.    Objective: Vital signs are as per the EMR.  In general patient is alert pleasant and in no acute distress.  He appears healthy.    Musculoskeletal: Tenderness to palpation over the left SI joint, none over the right.    Assessment and plan:    1.  Right scapular winging.  I filled out his paperwork for him.  This is scanned into the chart.  Follow-up with us as needed.  2.  Left SI dysfunction.  We will get him set up for a corticosteroid injection under IR guidance.  Follow-up as needed.

## 2021-06-20 NOTE — PROGRESS NOTES
Layo comes in today for follow-up of his right long thoracic nerve palsy along with SI joint pain.  He underwent a left SI joint injection on 8/8 which gave him significant pain relief.  He is having some pain on the right now and is wondering if he can repeat this.  The majority of the visit was spent discussing his disability claim.  He is currently going through the disability process for his long thoracic nerve palsy and was wondering, from a medical perspective, how he could go about getting the best opinion possible.  I recommended an independent medical exam by an occupational medicine specialist as this would give the  the best idea and the most specific recommendations regarding his level of disability.  I explained that usually  have physicians that patients see for these, but if not I explained that he should seek this opinion out as it may make his claim look more favorably in front of the .  He appreciated the input.    Objective: Vital signs are as per the EMR.  In general the patient is alert pleasant and in no acute distress.  He appears healthy.    Assessment and plan: Right long thoracic nerve palsy.  Discussion as above.  We will get him set up for a right SI joint injection.  Follow-up as needed.    30 minutes were spent with the patient, over half of which is in counseling and coordination of care.

## 2021-06-20 NOTE — PROGRESS NOTES
ASSESSMENT and PLAN:  1. Post-traumatic Stress Disorder  His psychiatrist is working with him.  He will be starting prazosin.    2. Bulimia nervosa  He is in a treatment program now.  He feels he is making progress.  He'll return here for a fasting blood sugar and a cholesterol panel.      Medications Discontinued During This Encounter   Medication Reason     oxyCODONE (ROXICODONE) 5 MG immediate release tablet Therapy completed     lidocaine (LIDODERM) 5 % Cost of medication     DULoxetine (CYMBALTA) 30 MG capsule Dose adjustment     dexamethasone (DECADRON) 4 mg/mL injection Therapy completed     cyclobenzaprine (FLEXERIL) 10 MG tablet Duplicate order       No Follow-up on file.    Patient Instructions   Flu shot today.    Set up a time to come back for fasting labs (an 8 hr fast is fine).    Take care!      CHIEF COMPLAINT:  Chief Complaint   Patient presents with     Follow-up       HISTORY OF PRESENT ILLNESS:  Kwasi Silverman is a 48 y.o. male  presenting to the clinic today for follow up.  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  This led to him being inactive and that was a trigger for his bulimia.  He recognized this and is now in the [facility] for eating d/o.  His psychiatrist there rx'd him prazosin.  He wanted to let me know that he was going to be on that.  We discussed the potential for this to help his nightmares and ptsd.  At a visit there, his BP was elevated, but that's not been a problem here, including today.    He'd like to get a flu shot today.    His psychiatrist recommend that he have a fasting glucose to screen for diabetes and a fasting cholesterol panel.  He's not fasting today, but he's able to come back another day.    REVIEW OF SYSTEMS:    All other systems are negative.    PFSH:  Family History   Problem Relation Age of Onset     Cancer Mother      breast     Urolithiasis Sister      Clotting disorder Neg Hx      Diabetes Neg Hx      Gout Neg Hx      Heart  "disease Neg Hx      TOBACCO USE:  History   Smoking Status     Never Smoker   Smokeless Tobacco     Never Used       VITALS:  Vitals:    10/03/18 1206   BP: 118/76   Patient Site: Right Arm   Patient Position: Sitting   Cuff Size: Adult Large   Pulse: 64   Weight: 194 lb (88 kg)   Height: 5' 9\" (1.753 m)     Wt Readings from Last 3 Encounters:   10/03/18 194 lb (88 kg)   08/06/18 194 lb (88 kg)   07/25/18 198 lb (89.8 kg)         PHYSICAL EXAM:  Constitutional:  Reveals an alert, pleasant adult male.   Vitals:  Noted.     QUALITY MEASURES:  The following high BMI interventions were performed this visit: weight monitoring    MEDICATIONS:  Current Outpatient Prescriptions   Medication Sig Dispense Refill     ALPRAZolam (XANAX) 1 MG tablet TAKE 1 TABLET BY MOUTH EVERY DAY 90 tablet 0     cyclobenzaprine (FLEXERIL) 10 MG tablet TAKE 1 TABLET (10 MG TOTAL) BY MOUTH 3 (THREE) TIMES A DAY AS NEEDED FOR MUSCLE SPASMS. 90 tablet 1     DULoxetine (CYMBALTA) 20 MG capsule Take 2 capsules by mouth once daily.       fluticasone (FLONASE) 50 mcg/actuation nasal spray INSTILL 1 SPRAY IN EACH NOSTRIL TWICE DAILY 48 g 3     lidocaine (XYLOCAINE) 5 % ointment Apply topical to affected areas twice daily as needed for pain. 35.44 g 3     naproxen (NAPROSYN) 500 MG tablet TAKE 1 TABLET(500 MG) BY MOUTH TWICE DAILY AS NEEDED 180 tablet 0     omeprazole (PRILOSEC) 20 MG capsule TAKE ONE CAPSULE BY MOUTH EVERY DAY 90 capsule 3     prazosin (MINIPRESS) 1 MG capsule Take 1 mg by mouth.       ranitidine (ZANTAC) 150 MG tablet Take 150 mg by mouth 2 (two) times a day.  4     SUMAtriptan (IMITREX) 50 MG tablet Take 1 tablet (50 mg total) by mouth as needed for migraine (may repeat every 2 hours.  max 200MG/day). 10 tablet 11     tamsulosin (FLOMAX) 0.4 mg Cp24 Take 1 capsule (0.4 mg total) by mouth daily. 5 capsule 0     traMADol (ULTRAM) 50 mg tablet Take 1 tablet (50 mg total) by mouth daily. 30 tablet 0     zolpidem (AMBIEN) 10 mg tablet " TAKE 1/2-1 TABLET (5-10 MG TOTAL) BY MOUTH AT BEDTIME AS NEEDED FOR SLEEP. 20 tablet 0     No current facility-administered medications for this visit.

## 2021-06-21 NOTE — LETTER
Letter by Cara Casey CHW at      Author: Cara Casey CHW Service: -- Author Type: --    Filed:  Encounter Date: 3/31/2021 Status: (Other)       CARE COORDINATION  [address] [facility] [address]    March 31, 2021    Kwasi Silverman  [address]  [facility] [address]      Dear Kwasi,    I am a clinic community health worker who works with Lan Murry MD at [facility]. I wanted to introduce myself and provide you with my contact information for you to be able to call me with any questions or concerns. Below is a description of clinic care coordination and how I can further assist you.      The clinic care coordination team is made up of a registered nurse,  and community health worker who understand the health care system. The goal of clinic care coordination is to help you manage your health and improve access to the health care system in the most efficient manner. The team can assist you in meeting your health care goals by providing education, coordinating services, strengthening the communication among your providers and supporting you with any resource needs.    Please feel free to contact me at [phone] with any questions or concerns. We are focused on providing you with the highest-quality healthcare experience possible and that all starts with you.     Sincerely,     YAIR Kilgore  Clinic Care Coordination   [phone]  [email or web address]

## 2021-06-21 NOTE — PROGRESS NOTES
Layo came in today to discuss his MRI reports.  The MRI of his SI joints revealed mild SI joint arthritis but no other significant abnormalities.  His MRI of his lumbar spine revealed some mild degenerative changes as well but again no significant abnormality such as nerve impingement or disc herniations.  He is interested in a second opinion in regards to treatment of his SI joint pain as he has underwent a number of injections with little relief.  He is otherwise doing well today.    Objective: Vital signs are as per the EMR.  In general the patient is alert pleasant and in no acute distress.  She appears healthy.    Assessment and plan: SI joint arthritis.  He was referred to [facility].  Follow-up as needed.

## 2021-06-21 NOTE — PROGRESS NOTES
Layo comes in today for follow-up of a couple of different issues.  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  He continues to have persistent right shoulder pain.  He was going to go back to the [facility] for evaluation, but apparently his insurance company told him that he had a limited number of visits that he had already used this year.  His insurance company then told him to go see Dr. Harry Navarro at [facility] orthopedics in [place].  Dr. Navarro said that they could conceivably do an EMG of the right arm, but because of his prior surgery he would be best served at the [facility].  I am going to try to petition his insurance company to allow him more visits.  He also has been having some persistent bilateral SI joint pain.  He has had injections in the SI joints which have given him 100% pain relief, but the pain then comes back in about 2 weeks.  He states the pain has been radiating upwards into his lumbar spine as well.  He is wondering if imaging would be helpful for him.    Objective: Vital signs are as per the EMR.  In general the patient is alert pleasant and in no acute distress.  He appears healthy.    Assessment and plan:    1.  Persistent lumbar spine and SI joint pain.  I am going to get MRIs of both of these areas given the persistence of his symptoms and the fact that his symptoms are refractory to injections.  I will call him with results.    2.  Chronic right shoulder pain along with a pectoral transfer to the right scapula in 2017.  Again we will try to petition his insurance to cover his visits at the [facility].

## 2021-06-22 NOTE — TELEPHONE ENCOUNTER
Controlled Substance Refill Request  Medication:   Requested Prescriptions     Pending Prescriptions Disp Refills     cyclobenzaprine (FLEXERIL) 10 MG tablet [Pharmacy Med Name: CYCLOBENZAPRINE 10MG TABLETS] 90 tablet 0     Sig: TAKE 1 TABLET(10 MG) BY MOUTH THREE TIMES DAILY AS NEEDED FOR MUSCLE SPASMS     traMADol (ULTRAM) 50 mg tablet [Pharmacy Med Name: TRAMADOL 50MG TABLETS] 30 tablet 0     Sig: TAKE 1 TABLET(50 MG) BY MOUTH DAILY     Date Last Fill: 12/6/2018  [facility]   Submit electronically to pharmacy  Controlled Substance Agreement on File:   Encounter-Level CSA Scan Date:    There are no encounter-level csa scan date.       Last office visit: 12/19/2018      RN cannot approve Refill Request    RN can NOT refill this medication med is not covered by policy/route to provider. Last office visit: 10/3/2018 Naima Murry MD Last Physical: Visit date not found Last MTM visit: Visit date not found Last visit same specialty: 12/19/2018 Lan Murry MD.  Next visit within 3 mo: Visit date not found  Next physical within 3 mo: Visit date not found      Ioana Chung, [facility] Connection Triage/Med Refill 1/3/2019    Requested Prescriptions   Pending Prescriptions Disp Refills     cyclobenzaprine (FLEXERIL) 10 MG tablet [Pharmacy Med Name: CYCLOBENZAPRINE 10MG TABLETS] 90 tablet 0     Sig: TAKE 1 TABLET(10 MG) BY MOUTH THREE TIMES DAILY AS NEEDED FOR MUSCLE SPASMS    There is no refill protocol information for this order        traMADol (ULTRAM) 50 mg tablet [Pharmacy Med Name: TRAMADOL 50MG TABLETS] 30 tablet 0     Sig: TAKE 1 TABLET(50 MG) BY MOUTH DAILY    Controlled Substances Refill Protocol Failed - 1/2/2019  2:48 PM       Failed - Route all Controlled Substance Requests to Provider       Failed - Patient has controlled substance agreement in past 12 months    Encounter-Level CSA Scan Date:    There are no encounter-level csa scan date.              Passed - Visit with PCP or  prescribing provider visit in past 12 months     Last office visit with prescriber/PCP: 10/3/2018 Naima Murry MD OR same dept: 12/19/2018 Lan Murry MD OR same specialty: 12/19/2018 Lan Murry MD Last physical: Visit date not found Last MTM visit: Visit date not found    Next visit within 3 mo: Visit date not found  Next physical within 3 mo: Visit date not found  Prescriber OR PCP: Naima Murry MD  Last diagnosis associated with med order: 1. Arthropathy Of Multiple Sites  - traMADol (ULTRAM) 50 mg tablet [Pharmacy Med Name: TRAMADOL 50MG TABLETS]; TAKE 1 TABLET(50 MG) BY MOUTH DAILY  Dispense: 30 tablet; Refill: 0

## 2021-06-22 NOTE — TELEPHONE ENCOUNTER
Order and records faxed to [facility]  Called and left Layo a  message that it is OK to schedule with [facility], phone number provided.  Also notified him that for the [facility] order we have submitted the [facility] Provider Recommendation Form for approval and [facility] has been notified of this.

## 2021-06-22 NOTE — TELEPHONE ENCOUNTER
Referral Request  Type of referral:  [facility]  Who s requesting: Patient  Why the request: Patient requesting to continue seeing [facility]-(unlimited visits - or 12 visits )   Pt see's  every 3 months for follow up for Right shoulder surgery( Pt still gets injections in his shoulder)    Have you been seen for this request: Yes by Dr.G Murry  Does patient have a preference on a group/provider ? [facility]   Okay to leave a detailed message?  Yes      Referral Request  Type of referral:  Orthopedics    Who s requesting: Patient  Why the request: Patient seen at [facility]- NOT the correct diagnosis, Pt not happy with facility ) ,needs new referral, referral needs to say unlimited visits, Pt advised he will eventually have surgery on his back.   Have you been seen for this request: Yes  Does patient have a preference on a group/provider ?   [facility]   Okay to leave a detailed message?  Yes

## 2021-06-22 NOTE — PROGRESS NOTES
Layo comes in today for follow-up of his SI joint pain.  I referred him to [facility] last month for a second opinion regarding this, as he has had good relief, albeit short relief, from corticosteroid injections.  He saw Dr. Mendez there, and review of the records and did not seem SI joints were addressed at all.  The focus was more on his lumbar spine which has some mild degenerative changes but otherwise no neural impingement.  This was demonstrated on MRI last month.  Apparently they wanted to do radiofrequency ablations of his L3, L4, and L5 nerve roots.  Layo has never had any pain radiating to his legs and his pain has always been localized to the SI area.  He had some reservations about undergoing radiofrequency ablation wanted to discuss this with me.  He also notes that he gets some diarrhea with the gabapentin that he was given by orthopedics as well.  After he takes his medication he will end up having multiple loose stools per day.  He is wondering if he can stop this.  He is otherwise well.    Objective: Vital signs are as per the EMR.  In general the patient is alert pleasant and in stress.  He appears healthy.    Assessment and plan: SI joint pain.  Unfortunately this was not addressed well at his orthopedic consultation.  I told him that he would not likely get much benefit from radiofrequency ablations of his lumbar nerve roots.  He was relieved that he would not have to go through this.  I told him that we should get a second opinion regarding his back and I gave him the name of Vic Ortiz at [facility].  He is going to stop the gabapentin and if his loose stools or not improved in a week we will plan on seeing him back.  Otherwise follow-up as needed.

## 2021-06-22 NOTE — TELEPHONE ENCOUNTER
RN cannot approve Refill Request    RN can NOT refill this medication med is not covered by policy/route to provider. Last office visit: 10/3/2018 Naima Murry MD Last Physical: Visit date not found Last MTM visit: Visit date not found Last visit same specialty: 12/19/2018 Lan Murry MD.  Next visit within 3 mo: Visit date not found  Next physical within 3 mo: Visit date not found      Afua Black, Care Connection Triage/Med Refill 1/1/2019    Requested Prescriptions   Pending Prescriptions Disp Refills     naproxen (NAPROSYN) 500 MG tablet [Pharmacy Med Name: NAPROXEN 500MG TABLETS] 180 tablet 0     Sig: TAKE 1 TABLET(500 MG) BY MOUTH TWICE DAILY AS NEEDED    There is no refill protocol information for this order

## 2021-06-23 NOTE — PROGRESS NOTES
Layo comes in today for follow-up of a couple of issues.  As far as his shoulder is concerned I called his insurance company to personally talk with them.  Please see my telephone note dated 2/4 for details of this.  He also underwent a colonoscopy on 2/1 for diarrhea.  The colonoscopy was grossly normal but biopsies revealed collagenous colitis.  He was given a prescription for 8 weeks of budesonide but has not started this yet.  I reassured him that this was a very good medication for this condition and would help his condition significantly.  He is going to start taking this.  Follow-up as needed.  This should be a no charge visit.

## 2021-06-23 NOTE — TELEPHONE ENCOUNTER
Called and spoke to Layo, he can provide a medical documentation to support the referral to [facility].  He will be seeing Dr Lan Murry today and will bring a copy to the specialty  for OON approval/review.    Received denial notification from [employer]-  As of 1/1/19, [facility] is out of network for this member and coverage can be obtained only through a health plan authorization.  Please work with your care team and Member Services to find an in network provider.  [facility] will only approve in-network benefit requests if medically necessary covered care for the condition is not avaiable in the member's network.      Provider Recommendation form submitted to [employer] on 1/3/19 for [facility] order.  Service Info 1/1/19 - 12/31/19  Requested service follow up every 3 months and as needed = 6 visits.

## 2021-06-23 NOTE — TELEPHONE ENCOUNTER
"Reached out to [employer] regarding Layo's denial of care at the [facility].  It is currently in the appeals process, and they are requesting up to 30 days from when they received Layo's updated information (1/31/19).  May need to do \"[id number]\" afterwards.  "

## 2021-06-23 NOTE — TELEPHONE ENCOUNTER
Submitted In Network Benefit request form to [facility] with documentation from [facility] and [facility]. Awaiting response for authorization/referral for [facility].    Kwasi met with specialty  and provided in network consultation note from [facility] dated 10/17/18 indicating plan on care that it would be in his best interest to continue being treated at [facility].

## 2021-06-23 NOTE — TELEPHONE ENCOUNTER
RN cannot approve Refill Request    RN can NOT refill this medication medication not on med list.  1/15/19 ov  He thought it was originally secondary to his gabapentin, but he had stopped this and his symptoms have not changed at all.    Patti Villarreal, Care Connection Triage/Med Refill 1/27/2019    Requested Prescriptions   Pending Prescriptions Disp Refills     gabapentin (NEURONTIN) 300 MG capsule [Pharmacy Med Name: GABAPENTIN 300MG CAPSULES] 90 capsule 0     Sig: TAKE 1 CAPSULE(300 MG) BY MOUTH DAILY    Gabapentin/Levetiracetam/Tiagabine Refill Protocol  Passed - 1/25/2019  1:30 PM       Passed - PCP or prescribing provider visit in past 12 months or next 3 months    Last office visit with prescriber/PCP: 1/15/2019 Lan Murry MD OR same dept: 1/15/2019 Lan Murry MD OR same specialty: 1/15/2019 Lan Murry MD  Last physical: Visit date not found Last MTM visit: Visit date not found   Next visit within 3 mo: Visit date not found  Next physical within 3 mo: Visit date not found  Prescriber OR PCP: Lan Murry MD  Last diagnosis associated with med order: There are no diagnoses linked to this encounter.  If protocol passes may refill for 12 months if within 3 months of last provider visit (or a total of 15 months).

## 2021-06-23 NOTE — TELEPHONE ENCOUNTER
Controlled Substance Refill Request  Medication:   Requested Prescriptions     Pending Prescriptions Disp Refills     zolpidem (AMBIEN) 10 mg tablet [Pharmacy Med Name: ZOLPIDEM 10MG TABLETS] 20 tablet 0     Sig: TAKE 1/2-1 TABLET BY MOUTH EVERY NIGHT AT BEDTIME AS NEEDED FOR SLEEP     Date Last Fill: 12/19/18  Ph[facility]acy: [id number]   Submit electronically to pharmacy  Controlled Substance Agreement on File:   Encounter-Level CSA Scan Date:    There are no encounter-level csa scan date.       Last office visit: Last office visit pertaining to requested medication was 1/15/18.

## 2021-06-23 NOTE — PROGRESS NOTES
Layo comes in today for evaluation of a couple of issues.    1.  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  He had a stabilization procedure performed at the [facility] about 18 months ago.  He is looking to get back to the [facility] as he has been having some more pain around the area.  However he is running into difficulties regarding insurance coverage for his visits down there.  Our specialty  is trying to work with his insurance company in order to try to get this arranged, but he wants to make sure that I would be willing expressing his need to go to the [facility] for follow-up care which I told him I would absolutely be willing to do.    2.  Layo has been having chronic diarrhea for about the last 2.5 months.  He states that he has had about 6-7 loose stools per day.  He states that 1 or 2 of them will be dark to black in color.  He states that he has been having some abdominal cramping as well.  He thought it was originally secondary to his gabapentin, but he had stopped this and his symptoms have not changed at all.    Objective: Vital signs are as per the EMR.  In general the patient is alert pleasant and in no acute distress.  He appears healthy.    Assessment and plan:    1.  Chronic diarrhea.  Check a CBC, CRP, sed rate, C. difficile, stool culture, O&P, and fecal white cells.  He will likely need a colonoscopy for further evaluation of this.    2.  Winging scapula.  He absolutely needs to be seen at the [facility] for follow-up of this, as there are no surgeons that do the procedure that he had in the [place] area.  Again I told him I would be happy to write him a letter regarding this.

## 2021-06-23 NOTE — TELEPHONE ENCOUNTER
Controlled Substance Refill Request  Medication:   Requested Prescriptions     Pending Prescriptions Disp Refills     cyclobenzaprine (FLEXERIL) 10 MG tablet [Pharmacy Med Name: CYCLOBENZAPRINE 10MG TABLETS] 90 tablet 0     Sig: TAKE 1 TABLET(10 MG) BY MOUTH THREE TIMES DAILY AS NEEDED FOR MUSCLE SPASMS     traMADol (ULTRAM) 50 mg tablet [Pharmacy Med Name: TRAMADOL 50MG TABLETS] 30 tablet 0     Sig: TAKE 1 TABLET(50 MG) BY MOUTH DAILY     Date Last Fill: 1/4/19  Pharmacy[email or web address] [id number]   Submit electronically to pharmacy  Controlled Substance Agreement on File:   Encounter-Level CSA Scan Date:    There are no encounter-level csa scan date.       Last office visit: Last office visit pertaining to requested medication was 1/15/19.      Provider Refill Request  Medication:  flexeril  RN can NOT refill this medication per RN refill protocol because med is not covered by policy/route to provider     Patti Villarreal RN Care Connection Triage/Medication Refill

## 2021-06-23 NOTE — TELEPHONE ENCOUNTER
Pt notified that test were negative - next step would be colonoscopy per [facility]J  - order yris Wells CMA  8:52 AM  1/21/2019

## 2021-06-24 NOTE — TELEPHONE ENCOUNTER
Patient Returning Call  Reason for call:  unknown  Information relayed to patient:  None noted in chart  Patient has additional questions:  Yes  If YES, what are your questions/concerns:  Patient is asking what the phone call was regarding last Friday. He also has questions for PCP regarding him getting seen at the [facility]. Lastly, he would like to discuss why he was started on Flomax.   Okay to leave a detailed message?: No

## 2021-06-24 NOTE — TELEPHONE ENCOUNTER
I see lab notes, but no letter or details regarding results. Please adivse and we can call pt back with response, Thank You.

## 2021-06-24 NOTE — TELEPHONE ENCOUNTER
Pt called in states he has dizziness.  Pt feel he will faint now  Unable to stand.  The dizziness is started now.  Has double vision.  Pt states he got injection from his PCP few minutes ago.  Pt states he is disorient.  Pt states he is unable to stand.  He has bad mouth test.  Inform the Pt to call 911.  Pt states his friend will take him to the ER now.  Care advice given per protocol.  Patient agrees with care advice given.   Agreed to call back if he has additional symptoms or questions.       González Swartz RN, Care Connection Triage/Med Refill 2/21/2019 5:38 PM        Reason for Disposition    Difficult to awaken or acting confused (e.g., disoriented, slurred speech)    Protocols used: DIZZINESS - [id number]

## 2021-06-24 NOTE — TELEPHONE ENCOUNTER
[facility] has approved the appeal for services to [facility].    Approved authorization for services from [facility] beginning 1/17/19 thru 1/16/20 for 12 visits  Authorization Ref # [id number]    Called and notified Layo that appeal has been approved he would like us to send a new referral for Dr Henry Sanchez, Physical Medicine and Rehab @ [facility]    Please place new order.

## 2021-06-24 NOTE — TELEPHONE ENCOUNTER
Received call from Manohar at [facility] appeals department [phone]  Requesting how many visits we are looking to get authorized?  Informed him we are requesting 12 visits.  He will forward this additional information for completion for appeals process.    Ashley - Specialty .

## 2021-06-24 NOTE — PROGRESS NOTES
Layo comes in today for bilateral anterior shoulder pain.  He states that this has been going on for the past two weeks and has been getting a bit worse.  He states that the pain is a sharp pain, and is worse on the right.  He underwent corticosteroid injections about 9 months ago at the [facility], and had success with them.  He is wondering if these can be repeated.    Obj: VSS, afeb    MSK:  TTP over the proximal biceps tendon bilaterally.  Strength 5/5 to resisted internal rotation, external rotation and supraspinatus testing.  Speed's positive bilaterally    A/P:      1) Bilateral biceps tendinosis.  He requested CS injections which I believe is very reasonable.  The right shoulder was prepped in the usual fashion, and a combination of 7 cc of 2% lidocaine and 1cc of kenalog were injected into the subacromial space in an posterior fashion.  Pt tolerated the procedure well with no immediate complications.  The exact same procedure was performed on the left with the same results.  Post-procedure exam improved.  F/U as needed

## 2021-06-24 NOTE — TELEPHONE ENCOUNTER
Patient Returning Call  Reason for call:  Returning phone call  Information relayed to patient:  Below message relayed to patient. Patient states he has not started th Flomax, and will return the prescription to the pharmacy.   Patient has additional questions:  No  If YES, what are your questions/concerns:  No additional questions at htis time.  Okay to leave a detailed message?: No call back needed

## 2021-06-24 NOTE — TELEPHONE ENCOUNTER
Tried calling Layo again, no answer.  I heard he didn't feel well after the injection, so I wanted to see how he was doing.  We can definitely work to get him into [place], but his 30 days is not going to be up until 3/3, thus on 3/4 we will be contacting  again.  He should NOT be on the Flomax at all. Not sure how this got called in to him

## 2021-06-24 NOTE — TELEPHONE ENCOUNTER
Order in.  For future reference, it is very helpful for us to have these orders teed up if possible.  Thank you.

## 2021-06-25 ENCOUNTER — COMMUNICATION - HEALTHEAST (OUTPATIENT)
Dept: INTERNAL MEDICINE | Facility: CLINIC | Age: 51
End: 2021-06-25

## 2021-06-25 DIAGNOSIS — M95.8 WINGED SCAPULA: ICD-10-CM

## 2021-06-25 RX ORDER — CYCLOBENZAPRINE HCL 10 MG
TABLET ORAL
Qty: 90 TABLET | Refills: 3 | Status: SHIPPED | OUTPATIENT
Start: 2021-06-25 | End: 2021-11-15

## 2021-06-25 NOTE — TELEPHONE ENCOUNTER
Reason for Call:  Request for results:    Name of test or procedure: JOSE G-positive results.     Date of test of procedure: 5/7/21    Location of the test or procedure:  [facility]astroenter[facility].     OK to leave the result message on voice mail or with a family member? Yes    Phone number Patient can be reached at:   Cell number on file:    Telephone Information:   Mobile [phone]     States that his phone sometimes doesn't take incoming call from the clinic and okay to contact his emergency contact Jorge Luis Garcia [phone] to get a hold of him.     Additional comments: Patient states that he had a positive JOSE G and was informed to contact his primary about what the next steps would be. Please advise. Thanks.      Call taken on 5/25/2021 at 2:38 PM by Aspen Croft

## 2021-06-25 NOTE — TELEPHONE ENCOUNTER
.Reason for Call:  Request for results:    Name of test or procedure: Labs     Date of test of procedure: 6/4/21    Location of the test or procedure: [facility]     OK to leave the result message on voice mail or with a family member? Yes    Phone number Patient can be reached at: Home number on file [phone] (home)    Additional comments: Please call patient with JOSE G results.     Call taken on 6/8/2021 at 11:50 AM by Marisa Watson

## 2021-06-25 NOTE — TELEPHONE ENCOUNTER
Pt informed - pt also asked about recent MRI and YEHUDA confirmed that the MRI was normal and no abnormalities were found. Pt was relieved to hear this. I told pt that GJ will send him a message on [employer] once his other labs are back    JUAN JOSE Wells  2:06 PM ........ 6/8/2021

## 2021-06-25 NOTE — TELEPHONE ENCOUNTER
Yes, JOSE G was slightly elevated, but we're waiting for more specific results before I can comment on a cause of the elevation.  May take another 3-4 days or so

## 2021-06-25 NOTE — TELEPHONE ENCOUNTER
MCKAY BLACKMAN would like to see pt in clinic for follow up on this. We can see his results/visit with GI in care everywhere

## 2021-06-25 NOTE — TELEPHONE ENCOUNTER
Reason for Call:  Request for results:    Name of test or procedure: Labs from 6/4/21 - DNA Antibody Screen, ANDRES Profile, Antinuclear Antibody [place]    Date of test of procedure: 6/4/21    Location of the test or procedure: [facility]    OK to leave the result message on voice mail or with a family member? Yes    Phone number Patient can be reached at: Home number on file [phone] (home)    Additional comments: Other     Call taken on 6/10/2021 at 8:10 AM by Bob Olivia

## 2021-06-26 NOTE — PROGRESS NOTES
Assessment & Plan   Problem List Items Addressed This Visit     None      Visit Diagnoses     Laboratory examination ordered as part of a routine general medical examination    -  Primary    Relevant Orders    Antinuclear Antibody (JOSE G) [place]           Elevated JOSE G (with a titer of 1:320) in the context of a work-up for pancreatitis and abdominal pain.  I spent time discussing the interpretation of ANAs and the fact that they can often be falsely elevated (as in, elevation of the lab value in the absence of disease).  We discussed that ANAs need to be interpreted in the context of the patient's symptoms before they can be used to diagnose illness.  I am going to repeat his JOSE G titer to see if it is elevated and if it is to see exactly what type of antinuclear antibody is present.  I will call him with the result.  We may need to consider rheumatology referral potentially, but I believe that this is most likely a false positive value.      40 minutes were spent in total with the patient, including precharting, assessment, diagnosis, lab review, and documentation.        No follow-ups on file.    Lan Murry MD  [facility]   Kwasi Silverman is 51 y.o. and presents today for the following health issues     Layo comes in today for follow-up of a visit with gastroenterology.  He saw a nurse practitioner in gastroenterology at the [facility] on 5/19 in regards to his recent bout of pancreatitis of unknown etiology along with some residual abdominal pain.  He had a number of labs drawn in that context, including a normal CBC and BMP.  Liver function tests were normal.  Celiac panel was normal.  Anti-smooth muscle and antimitochondrial antibodies were normal.  Immunoglobulin levels were normal.  Hepatitis A, B, and C antibodies were normal.  He had an elevated JOSE G at 1:320 and was told to follow-up with me in regards to this.  Layo states his abdominal  pain is improving.  He is quite anxious in regards to this result, as he read that this can be associated with a number of autoimmune diseases.        Objective    /80   Pulse 92   There is no height or weight on file to calculate BMI.  Physical Exam

## 2021-06-26 NOTE — TELEPHONE ENCOUNTER
RN cannot approve Refill Request    RN can NOT refill this medication med is not covered by policy/route to provider. Last office visit: 6/4/2021 Lan Murry MD Last Physical: Visit date not found Last MTM visit: Visit date not found Last visit same specialty: 6/4/2021 Lan Murry MD.  Next visit within 3 mo: Visit date not found  Next physical within 3 mo: Visit date not found      Delmy Schofield, [facility] Triage/Med Refill 6/25/2021    Requested Prescriptions   Pending Prescriptions Disp Refills     cyclobenzaprine (FLEXERIL) 10 MG tablet [Pharmacy Med Name: CYCLOBENZAPRINE 10MG TABLETS] 90 tablet 3     Sig: TAKE 1 TABLET(10 MG) BY MOUTH THREE TIMES DAILY AS NEEDED FOR MUSCLE SPASMS       There is no refill protocol information for this order

## 2021-07-03 NOTE — ADDENDUM NOTE
Addendum Note by Naima Mar LPN at 3/8/2018  8:22 AM     Author: Naima Mar LPN Service: -- Author Type: Licensed Nurse    Filed: 3/8/2018  8:22 AM Encounter Date: 3/1/2018 Status: Signed    : Naima Mar LPN (Licensed Nurse)    Addended by: NAIMA MAR on: 3/8/2018 08:22 AM        Modules accepted: Orders

## 2021-07-03 NOTE — ADDENDUM NOTE
Addendum Note by Pratibha Vega RN at 1/10/2017  9:57 AM     Author: Pratibha Vega RN Service: -- Author Type: Registered Nurse    Filed: 1/10/2017  9:57 AM Encounter Date: 1/9/2017 Status: Signed    : Pratibha Vega RN (Registered Nurse)    Addended by: PRATIBHA VEGA on: 1/10/2017 09:57 AM        Modules accepted: Orders

## 2021-07-03 NOTE — ADDENDUM NOTE
Addendum Note by Lan Murry MD at 1/16/2019 10:45 AM     Author: Lan Murry MD Service: -- Author Type: Physician    Filed: 1/21/2019  8:54 AM Encounter Date: 1/16/2019 Status: Signed    : Lan Murry MD (Physician)    Addended by: LAN MURRY on: 1/21/2019 08:54 AM        Modules accepted: Orders

## 2021-07-03 NOTE — ADDENDUM NOTE
Addendum Note by Lan Murry MD at 3/6/2019 10:41 AM     Author: Lan Murry MD Service: -- Author Type: Physician    Filed: 3/6/2019 10:41 AM Encounter Date: 2/4/2019 Status: Signed    : Lan Murry MD (Physician)    Addended by: LAN MURRY on: 3/6/2019 10:41 AM        Modules accepted: Orders

## 2021-07-04 NOTE — ADDENDUM NOTE
Addendum Note by Freya Horton CMA at 3/2/2021 10:19 AM     Author: Freya Horton CMA Service: -- Author Type: Certified Medical Assistant    Filed: 3/2/2021 10:19 AM Encounter Date: 2/3/2021 Status: Signed    : Freya Horton CMA (Certified Medical Assistant)    Addended by: FREYA HORTON on: 3/2/2021 10:19 AM        Modules accepted: Orders

## 2021-07-06 ENCOUNTER — COMMUNICATION - HEALTHEAST (OUTPATIENT)
Dept: INTERNAL MEDICINE | Facility: CLINIC | Age: 51
End: 2021-07-06

## 2021-07-06 VITALS — SYSTOLIC BLOOD PRESSURE: 112 MMHG | HEART RATE: 92 BPM | DIASTOLIC BLOOD PRESSURE: 80 MMHG

## 2021-07-06 DIAGNOSIS — M12.9 ARTHROPATHY, MULTIPLE SITES: ICD-10-CM

## 2021-07-07 RX ORDER — TRAMADOL HYDROCHLORIDE 50 MG/1
TABLET ORAL
Qty: 30 TABLET | Refills: 0 | Status: SHIPPED | OUTPATIENT
Start: 2021-07-07 | End: 2021-11-15

## 2021-07-07 NOTE — TELEPHONE ENCOUNTER
Telephone Encounter by Nella Mendoza, RN at 7/6/2021  7:14 PM     Author: Nella Mendoza RN Service: -- Author Type: Registered Nurse    Filed: 7/6/2021  7:15 PM Encounter Date: 7/6/2021 Status: Signed    : Nella Mendoza RN (Registered Nurse)       Controlled Substance Refill Request  Medication Name:   Requested Prescriptions     Pending Prescriptions Disp Refills   ? traMADoL (ULTRAM) 50 mg tablet [Pharmacy Med Name: TRAMADOL 50MG TABLETS] 30 tablet 0     Sig: TAKE 1 TABLET(50 MG) BY MOUTH DAILY     Date Last Fill: 5/6/21  Requested Pharmacy: [facility]  Submit electronically to pharmacy  Controlled Substance Agreement on file:   Encounter-Level CSA Scan Date:    There are no encounter-level csa scan date.        Last office visit:  6/4/21  Nella Mendoza RN, MA  [facility]    Triage Nurse Advisor

## 2021-07-11 ENCOUNTER — HEALTH MAINTENANCE LETTER (OUTPATIENT)
Age: 51
End: 2021-07-11

## 2021-07-13 ENCOUNTER — COMMUNICATION - HEALTHEAST (OUTPATIENT)
Dept: INTERNAL MEDICINE | Facility: CLINIC | Age: 51
End: 2021-07-13

## 2021-07-13 NOTE — TELEPHONE ENCOUNTER
Telephone Encounter by Bob Olivia CMA at 7/13/2021  7:03 AM     Author: Bob Olivia CMA Service: -- Author Type: Certified Medical Assistant    Filed: 7/13/2021  7:03 AM Encounter Date: 6/10/2021 Status: Signed    : Bob Olivia CMA (Certified Medical Assistant)       Created in error

## 2021-08-03 DIAGNOSIS — M12.9 ARTHROPATHY, MULTIPLE SITES: ICD-10-CM

## 2021-08-07 NOTE — TELEPHONE ENCOUNTER
Routing refill request to provider for review/approval because:  Controlled medication refill request.  Routing to provider's care team.  ___________________________________________________________    Copy of Last Rx:        Last office visit with provider:  6/4/21   ___________________________________________________________      Requested Prescriptions   Pending Prescriptions Disp Refills     traMADol (ULTRAM) 50 MG tablet [Pharmacy Med Name: TRAMADOL 50MG TABLETS] 30 tablet      Sig: TAKE 1 TABLET(50 MG) BY MOUTH DAILY       There is no refill protocol information for this order            Delmy Schofield RN 08/06/21 7:21 PM

## 2021-08-09 RX ORDER — TRAMADOL HYDROCHLORIDE 50 MG/1
TABLET ORAL
Qty: 30 TABLET | Refills: 1 | Status: SHIPPED | OUTPATIENT
Start: 2021-08-09 | End: 2021-10-05

## 2021-08-09 NOTE — TELEPHONE ENCOUNTER
Patient calling to check the status of his refill.  He is out of town and wants to make sure this is going to [facility] in [place]

## 2021-09-05 ENCOUNTER — HEALTH MAINTENANCE LETTER (OUTPATIENT)
Age: 51
End: 2021-09-05

## 2021-10-04 DIAGNOSIS — M12.9 ARTHROPATHY, MULTIPLE SITES: ICD-10-CM

## 2021-10-04 DIAGNOSIS — K22.5 ZENKER'S DIVERTICULUM: ICD-10-CM

## 2021-10-04 RX ORDER — MECOBALAMIN 5000 MCG
TABLET,DISINTEGRATING ORAL
Qty: 45 CAPSULE | Refills: 2 | Status: SHIPPED | OUTPATIENT
Start: 2021-10-04 | End: 2022-11-06

## 2021-10-04 NOTE — TELEPHONE ENCOUNTER
"Last Written Prescription Date:  7/7/20  Last Fill Quantity: 45,  # refills: 3   Last office visit provider:  6/4/21     Requested Prescriptions   Pending Prescriptions Disp Refills     traMADol (ULTRAM) 50 MG tablet [Pharmacy Med Name: TRAMADOL 50MG TABLETS] 30 tablet      Sig: TAKE 1 TABLET(50 MG) BY MOUTH DAILY       There is no refill protocol information for this order        LANsoprazole (PREVACID) 15 MG DR capsule [Pharmacy Med Name: LANSOPRAZOLE 15MG DR CAPSULES] 45 capsule 3     Sig: TAKE 1 CAPSULE(15 MG) BY MOUTH EVERY OTHER DAY       PPI Protocol Passed - 10/4/2021  8:54 AM        Passed - Not on Clopidogrel (unless Pantoprazole ordered)        Passed - No diagnosis of osteoporosis on record        Passed - Recent (12 mo) or future (30 days) visit within the authorizing provider's specialty     Patient has had an office visit with the authorizing provider or a provider within the authorizing providers department within the previous 12 mos or has a future within next 30 days. See \"Patient Info\" tab in inbasket, or \"Choose Columns\" in Meds & Orders section of the refill encounter.              Passed - Medication is active on med list        Passed - Patient is age 18 or older             Taigo Elkins RN 10/04/21 12:00 PM  "

## 2021-10-04 NOTE — TELEPHONE ENCOUNTER
"Routing refill request to provider for review/approval because:  Controlled substance request    Last Written Prescription Date:  8/9/21  Last Fill Quantity: 30,  # refills: 1   Last office visit provider:  6/4/21     Requested Prescriptions   Pending Prescriptions Disp Refills     traMADol (ULTRAM) 50 MG tablet [Pharmacy Med Name: TRAMADOL 50MG TABLETS] 30 tablet      Sig: TAKE 1 TABLET(50 MG) BY MOUTH DAILY       There is no refill protocol information for this order      Signed Prescriptions Disp Refills    LANsoprazole (PREVACID) 15 MG DR capsule 45 capsule 2     Sig: TAKE 1 CAPSULE(15 MG) BY MOUTH EVERY OTHER DAY       PPI Protocol Passed - 10/4/2021  8:54 AM        Passed - Not on Clopidogrel (unless Pantoprazole ordered)        Passed - No diagnosis of osteoporosis on record        Passed - Recent (12 mo) or future (30 days) visit within the authorizing provider's specialty     Patient has had an office visit with the authorizing provider or a provider within the authorizing providers department within the previous 12 mos or has a future within next 30 days. See \"Patient Info\" tab in inbasket, or \"Choose Columns\" in Meds & Orders section of the refill encounter.              Passed - Medication is active on med list        Passed - Patient is age 18 or older             Tiago Elkins RN 10/04/21 12:01 PM  "

## 2021-10-05 RX ORDER — TRAMADOL HYDROCHLORIDE 50 MG/1
TABLET ORAL
Qty: 30 TABLET | Refills: 3 | Status: SHIPPED | OUTPATIENT
Start: 2021-10-05 | End: 2022-02-03

## 2021-10-08 DIAGNOSIS — K21.9 GASTROESOPHAGEAL REFLUX DISEASE WITHOUT ESOPHAGITIS: ICD-10-CM

## 2021-10-10 NOTE — TELEPHONE ENCOUNTER
"Routing refill request to provider for review/approval because:  Early refill requested.    Last Written Prescription Date:  5/10/21  Last Fill Quantity: 90,  # refills: 1   Last office visit provider:  6/4/21     Requested Prescriptions   Pending Prescriptions Disp Refills     pantoprazole (PROTONIX) 20 MG EC tablet [Pharmacy Med Name: PANTOPRAZOLE 20MG TABLETS] 90 tablet 1     Sig: TAKE 1 TABLET(20 MG) BY MOUTH DAILY       PPI Protocol Passed - 10/8/2021  3:18 PM        Passed - Not on Clopidogrel (unless Pantoprazole ordered)        Passed - No diagnosis of osteoporosis on record        Passed - Recent (12 mo) or future (30 days) visit within the authorizing provider's specialty     Patient has had an office visit with the authorizing provider or a provider within the authorizing providers department within the previous 12 mos or has a future within next 30 days. See \"Patient Info\" tab in inbasket, or \"Choose Columns\" in Meds & Orders section of the refill encounter.              Passed - Medication is active on med list        Passed - Patient is age 18 or older             Tiago Elkins RN 10/10/21 9:59 AM  "

## 2021-10-11 RX ORDER — PANTOPRAZOLE SODIUM 20 MG/1
TABLET, DELAYED RELEASE ORAL
Qty: 90 TABLET | Refills: 1 | Status: SHIPPED | OUTPATIENT
Start: 2021-10-11 | End: 2023-03-02

## 2021-10-19 NOTE — TELEPHONE ENCOUNTER
10---1100--Faxed new referral paperwork to Nursing homes as requested
yesterday (10-) on phone/family meeting.  They were sent to:
[facility] and
[facility].
Waiting on calls from facilities. [facility] for [facility].  He is awaiting to be scheduled for shoulder surgery that was put of because of COVID in 2020 and now needs us to extend the request into 2021.    Once this authorization is in place he will be able to schedule his surgery with [facility] - Dr Daniel Roland

## 2021-10-23 DIAGNOSIS — M95.8 WINGED SCAPULA: Primary | ICD-10-CM

## 2021-10-25 RX ORDER — CYCLOBENZAPRINE HCL 10 MG
TABLET ORAL
Qty: 90 TABLET | Refills: 1 | Status: SHIPPED | OUTPATIENT
Start: 2021-10-25 | End: 2021-11-15

## 2021-10-25 NOTE — TELEPHONE ENCOUNTER
Routing refill request to provider for review/approval because:  Drug not on the G refill protocol     Last Written Prescription Date:  6/25/2021  Last Fill Quantity: 90,  # refills: 3   Last office visit provider:  6/4/2021- Dr Murry    cyclobenzaprine (FLEXERIL) 10 MG tablet 90 tablet 3 6/25/2021  No   Sig: TAKE 1 TABLET(10 MG) BY MOUTH THREE TIMES DAILY AS NEEDED FOR MUSCLE SPASMS   Sent to pharmacy as: cyclobenzaprine 10 mg tablet (FLEXERIL)   E-Prescribing Status: Receipt confirmed by pharmacy (6/25/2021  9:49 AM CDT       Requested Prescriptions   Pending Prescriptions Disp Refills     cyclobenzaprine (FLEXERIL) 10 MG tablet [Pharmacy Med Name: CYCLOBENZAPRINE 10MG TABLETS] 90 tablet      Sig: TAKE 1 TABLET(10 MG) BY MOUTH THREE TIMES DAILY AS NEEDED FOR MUSCLE SPASMS       There is no refill protocol information for this order          Gill Choi RN 10/24/21 11:47 PM

## 2021-11-01 DIAGNOSIS — R20.8 DYSESTHESIA: ICD-10-CM

## 2021-11-01 RX ORDER — ALPRAZOLAM 1 MG
TABLET ORAL
Qty: 90 TABLET | Refills: 0 | Status: SHIPPED | OUTPATIENT
Start: 2021-11-01 | End: 2022-02-03

## 2021-11-15 ENCOUNTER — OFFICE VISIT (OUTPATIENT)
Dept: INTERNAL MEDICINE | Facility: CLINIC | Age: 51
End: 2021-11-15
Payer: COMMERCIAL

## 2021-11-15 VITALS
WEIGHT: 185 LBS | HEART RATE: 86 BPM | DIASTOLIC BLOOD PRESSURE: 80 MMHG | BODY MASS INDEX: 27.4 KG/M2 | SYSTOLIC BLOOD PRESSURE: 112 MMHG | HEIGHT: 69 IN | OXYGEN SATURATION: 97 %

## 2021-11-15 DIAGNOSIS — Z01.818 PREOP GENERAL PHYSICAL EXAM: Primary | ICD-10-CM

## 2021-11-15 DIAGNOSIS — M75.21 BICEPS TENDONITIS, RIGHT: ICD-10-CM

## 2021-11-15 PROBLEM — S24.8XXA INJURY OF LONG THORACIC NERVE: Status: ACTIVE | Noted: 2018-09-27

## 2021-11-15 PROCEDURE — 99214 OFFICE O/P EST MOD 30 MIN: CPT | Performed by: INTERNAL MEDICINE

## 2021-11-15 RX ORDER — DOXYCYCLINE 100 MG/1
CAPSULE ORAL
COMMUNITY
Start: 2021-10-21 | End: 2022-05-26

## 2021-11-15 RX ORDER — SUMATRIPTAN 50 MG/1
50 TABLET, FILM COATED ORAL
COMMUNITY

## 2021-11-15 RX ORDER — LIFITEGRAST 50 MG/ML
1 SOLUTION/ DROPS OPHTHALMIC
COMMUNITY
Start: 2021-10-09

## 2021-11-15 RX ORDER — MECLIZINE HYDROCHLORIDE 25 MG/1
25 TABLET ORAL
COMMUNITY
Start: 2021-10-21 | End: 2022-01-25

## 2021-11-15 RX ORDER — DICLOFENAC SODIUM 75 MG/1
TABLET, DELAYED RELEASE ORAL
COMMUNITY
Start: 2021-10-25 | End: 2021-11-15

## 2021-11-15 RX ORDER — POLYETHYLENE GLYCOL 3350 17 G/17G
POWDER, FOR SOLUTION ORAL
COMMUNITY
Start: 2021-05-26 | End: 2022-01-25

## 2021-11-15 RX ORDER — MAGNESIUM CARB/ALUMINUM HYDROX 105-160MG
TABLET,CHEWABLE ORAL
COMMUNITY
Start: 2021-05-26 | End: 2022-05-26

## 2021-11-15 ASSESSMENT — MIFFLIN-ST. JEOR: SCORE: 1689.53

## 2021-11-15 NOTE — PROGRESS NOTES
[facility]  [address] [facility] [address]  Phone: [phone]  Fax: [phone]  Primary Provider: Lan Murry  Pre-op Performing Provider: LAN MURRY      PREOPERATIVE EVALUATION:  Today's date: 11/15/2021    Kwasi Silverman is a 51 year old male who presents for a preoperative evaluation.    Surgical Information:  Surgery/Procedure: RT shoulder surgery  Surgery Location: [facility]  Surgeon: Andrew White  Surgery Date: 11-  Time of Surgery:   Where patient plans to recover: At home with family      Type of Anesthesia Anticipated: to be determined    Assessment & Plan     The proposed surgical procedure is considered INTERMEDIATE risk.    Problem List Items Addressed This Visit     None      Visit Diagnoses     Preop general physical exam    -  Primary    Biceps tendonitis, right                RECOMMENDATION:  APPROVAL GIVEN to proceed with proposed procedure, without further diagnostic evaluation.  No aspirin or NSAIDs prior to the procedure.           Subjective     HPI related to upcoming procedure: Layo has been having chronic right proximal biceps tendon pain and is going to undergo a biceps tenodesis for definitive treatment.  He is feeling well today otherwise and has no acute complaints.  No history of obstructive sleep apnea or underlying lung disease.    Preop Questions 11/15/2021   1. Have you ever had a heart attack or stroke? No   2. Have you ever had surgery on your heart or blood vessels, such as a stent placement, a coronary artery bypass, or surgery on an artery in your head, neck, heart, or legs? No   3. Do you have chest pain with activity? No   4. Do you have a history of  heart failure? No   5. Do you currently have a cold, bronchitis or symptoms of other infection? No   6. Do you have a cough, shortness of breath, or wheezing? No   7. Do you or anyone in your family have previous history of blood clots? No   8. Do  you or does anyone in your family have a serious bleeding problem such as prolonged bleeding following surgeries or cuts? No   9. Have you ever had problems with anemia or been told to take iron pills? No   10. Have you had any abnormal blood loss such as black, tarry or bloody stools? No   11. Have you ever had a blood transfusion? No   12. Are you willing to have a blood transfusion if it is medically needed before, during, or after your surgery? Yes   13. Have you or any of your relatives ever had problems with anesthesia? No   14. Do you have sleep apnea, excessive snoring or daytime drowsiness? No   15. Do you have any artifical heart valves or other implanted medical devices like a pacemaker, defibrillator, or continuous glucose monitor? No   16. Do you have artificial joints? No   17. Are you allergic to latex? No       Patient Active Problem List    Diagnosis Date Noted     Gastroesophageal reflux disease without esophagitis 05/10/2021     Priority: Medium     Zenker's diverticulum 11/22/2019     Priority: Medium     Collagenous colitis 02/05/2019     Priority: Medium     Winged scapula 08/04/2017     Priority: Medium     Generalized Anxiety Disorder      Priority: Medium     Created by Conversion         Insomnia      Priority: Medium     Created by Conversion         Post-traumatic Stress Disorder      Priority: Medium     Created by Conversion          Past Medical History:   Diagnosis Date     Anxiety      Kidney stone      Migraine      Winged scapula      Past Surgical History:   Procedure Laterality Date     C APPENDECTOMY      Description: Appendectomy;  Recorded: 12/20/2013;     CT SACROILIAC JOINT INJECTION BILATERAL  10/18/2019     SINUS SURGERY       Current Outpatient Medications   Medication Sig Dispense Refill     ALPRAZolam (XANAX) 1 MG tablet [ALPRAZOLAM (XANAX) 1 MG TABLET] TAKE 1 TABLET(1 MG) BY MOUTH DAILY 90 tablet 0     cyclobenzaprine (FLEXERIL) 10 MG tablet [CYCLOBENZAPRINE (FLEXERIL)  10 MG TABLET] Take 1 tablet (10 mg total) by mouth 3 (three) times a day as needed for muscle spasms. 90 tablet 3     diclofenac (VOLTAREN) 75 MG EC tablet TAKE 1 TABLET BY MOUTH EVERY 8 HOURS AS NEEDED       doxycycline monohydrate (MONODOX) 100 MG capsule        fluticasone propionate (FLONASE) 50 mcg/actuation nasal spray [FLUTICASONE PROPIONATE (FLONASE) 50 MCG/ACTUATION NASAL SPRAY] Use two sprays per nostril once per day 48 g 3     gabapentin (NEURONTIN) 300 MG capsule [GABAPENTIN (NEURONTIN) 300 MG CAPSULE] TAKE 1 CAPSULE(300 MG) BY MOUTH DAILY 90 capsule 3     LANsoprazole (PREVACID) 15 MG DR capsule TAKE 1 CAPSULE(15 MG) BY MOUTH EVERY OTHER DAY 45 capsule 2     lidocaine (XYLOCAINE) 5 % ointment [LIDOCAINE (XYLOCAINE) 5 % OINTMENT] Apply topical to affected areas twice daily as needed for pain. 35.44 g 3     lifitegrast (XIIDRA) 5 % opthalmic solution 1 drop       magnesium citrate 1.745 GM/30ML solution        meclizine (ANTIVERT) 25 MG tablet Take 25 mg by mouth       naproxen (NAPROSYN) 500 MG tablet [NAPROXEN (NAPROSYN) 500 MG TABLET] Take 500 mg by mouth at bedtime.       omeprazole (PRILOSEC) 20 MG DR capsule Take 20 mg by mouth       pantoprazole (PROTONIX) 20 MG EC tablet TAKE 1 TABLET(20 MG) BY MOUTH DAILY 90 tablet 1     polyethylene glycol (MIRALAX) 17 GM/Dose powder TAKE AS DIRECTED IN PATIENT INSTRUCTIONS       SUMAtriptan (IMITREX) 50 MG tablet Take 50 mg by mouth       traMADol (ULTRAM) 50 MG tablet TAKE 1 TABLET(50 MG) BY MOUTH DAILY 30 tablet 3     traMADoL (ULTRAM) 50 mg tablet [TRAMADOL (ULTRAM) 50 MG TABLET] TAKE 1 TABLET(50 MG) BY MOUTH DAILY (Patient not taking: Reported on 11/15/2021) 30 tablet 0     traMADoL (ULTRAM) 50 mg tablet [TRAMADOL (ULTRAM) 50 MG TABLET] Take 1 tablet (50 mg total) by mouth daily. (Patient not taking: Reported on 11/15/2021) 30 tablet 0       Allergies   Allergen Reactions     Bactrim [Sulfamethoxazole W/Trimethoprim] Other (See Comments)     Possible  "Pancreatitis     Penicillins Unknown     Procaine Other (See Comments)     PN: LW Reaction: SYNCOPY        Social History     Tobacco Use     Smoking status: Never Smoker     Smokeless tobacco: Never Used   Substance Use Topics     Alcohol use: No       History   Drug Use Not on file         Objective     /80 (BP Location: Right arm, Patient Position: Sitting, Cuff Size: Adult Large)   Pulse 86   Ht 1.753 m (5' 9\")   Wt 83.9 kg (185 lb)   SpO2 97%   BMI 27.32 kg/m      Physical Exam    GENERAL APPEARANCE: healthy, alert and no distress     EYES: EOMI,  PERRL     HENT: ear canals and TM's normal and nose and mouth without ulcers or lesions     NECK: no adenopathy, no asymmetry, masses, or scars and thyroid normal to palpation     RESP: lungs clear to auscultation - no rales, rhonchi or wheezes     CV: regular rates and rhythm, normal S1 S2, no S3 or S4 and no murmur, click or rub     ABDOMEN:  soft, nontender, no HSM or masses and bowel sounds normal     MS: extremities normal- no gross deformities noted, no evidence of inflammation in joints, FROM in all extremities.     SKIN: no suspicious lesions or rashes     NEURO: Normal strength and tone, sensory exam grossly normal, mentation intact and speech normal     PSYCH: mentation appears normal. and affect normal/bright     LYMPHATICS: No cervical adenopathy    Recent Labs   Lab Test 03/30/21  0607 03/29/21  0611 03/27/21  0714 03/15/21  2230 03/15/21  2230   HGB 13.0* 13.1*  --    < >  --     291  --    < >  --    INR  --   --   --   --  1.06     --  137   < >  --    POTASSIUM 4.0  --  4.2   < >  --    CR 0.85  --  0.87   < >  --     < > = values in this interval not displayed.        Diagnostics:  No labs were ordered during this visit.   No EKG required, no history of coronary heart disease, significant arrhythmia, peripheral arterial disease or other structural heart disease.    Revised Cardiac Risk Index (RCRI):  The patient has the " following serious cardiovascular risks for perioperative complications:   - No serious cardiac risks = 0 points     RCRI Interpretation: 0 points: Class I (very low risk - 0.4% complication rate)           Signed Electronically by: Lan Murry MD  Copy of this evaluation report is provided to requesting physician.

## 2021-11-16 ENCOUNTER — LAB (OUTPATIENT)
Dept: LAB | Facility: CLINIC | Age: 51
End: 2021-11-16
Payer: COMMERCIAL

## 2021-11-16 DIAGNOSIS — Z20.822 COVID-19 RULED OUT: Primary | ICD-10-CM

## 2021-11-16 DIAGNOSIS — Z20.822 COVID-19 RULED OUT: ICD-10-CM

## 2021-11-16 LAB — SARS-COV-2 RNA RESP QL NAA+PROBE: NEGATIVE

## 2021-11-16 PROCEDURE — U0003 INFECTIOUS AGENT DETECTION BY NUCLEIC ACID (DNA OR RNA); SEVERE ACUTE RESPIRATORY SYNDROME CORONAVIRUS 2 (SARS-COV-2) (CORONAVIRUS DISEASE [COVID-19]), AMPLIFIED PROBE TECHNIQUE, MAKING USE OF HIGH THROUGHPUT TECHNOLOGIES AS DESCRIBED BY CMS-2020-01-R: HCPCS

## 2021-11-16 PROCEDURE — U0005 INFEC AGEN DETEC AMPLI PROBE: HCPCS

## 2021-12-01 DIAGNOSIS — M95.8 WINGED SCAPULA: Primary | ICD-10-CM

## 2021-12-01 RX ORDER — GABAPENTIN 100 MG/1
CAPSULE ORAL
Qty: 30 CAPSULE | OUTPATIENT
Start: 2021-12-01

## 2021-12-03 RX ORDER — GABAPENTIN 100 MG/1
CAPSULE ORAL
Qty: 30 CAPSULE | Refills: 0 | Status: SHIPPED | OUTPATIENT
Start: 2021-12-03 | End: 2021-12-27

## 2021-12-03 NOTE — TELEPHONE ENCOUNTER
Routing refill request to provider for review/approval because:  Controlled Substance      Last Written Prescription Date:  12/1/2021  Last Fill Quantity: 90,  # refills: 3   Last office visit provider:  11/15/2021     Requested Prescriptions   Pending Prescriptions Disp Refills     gabapentin (NEURONTIN) 100 MG capsule [Pharmacy Med Name: GABAPENTIN 100MG CAPSULES] 30 capsule      Sig: TAKE 3 CAPSULES BY MOUTH AT BEDTIME FOR UP TO 10 DOSES AS NEEDED FOR PAIN       There is no refill protocol information for this order          Judith Mcnair RN 12/03/21 6:31 AM

## 2021-12-22 DIAGNOSIS — M95.8 WINGED SCAPULA: ICD-10-CM

## 2021-12-25 NOTE — TELEPHONE ENCOUNTER
Routing refill request to provider for review/approval because:  Drug not on the [facility] refill protocol     Last Written Prescription Date:  12/3/21  Last Fill Quantity: 30,  # refills: 0   Last office visit provider:  11/15/21     Requested Prescriptions   Pending Prescriptions Disp Refills     gabapentin (NEURONTIN) 100 MG capsule [Pharmacy Med Name: GABAPENTIN 100MG CAPSULES] 30 capsule 0     Sig: TAKE 3 CAPSULES BY MOUTH AT BEDTIME FOR UP TO 10 DOSES AS NEEDED FOR PAIN       There is no refill protocol information for this order          Niki Beebe RN 12/25/21 12:29 PM

## 2021-12-27 RX ORDER — GABAPENTIN 100 MG/1
CAPSULE ORAL
Qty: 30 CAPSULE | Refills: 0 | Status: SHIPPED | OUTPATIENT
Start: 2021-12-27 | End: 2022-01-25

## 2022-01-03 ENCOUNTER — TELEPHONE (OUTPATIENT)
Dept: INTERNAL MEDICINE | Facility: CLINIC | Age: 52
End: 2022-01-03
Payer: COMMERCIAL

## 2022-01-03 DIAGNOSIS — M95.8 WINGED SCAPULA: ICD-10-CM

## 2022-01-03 NOTE — TELEPHONE ENCOUNTER
Reason for Call:  Medication or medication refill:    Do you use a [facility]?  Name of the pharmacy and phone number for the current request:  [id number] [place], [place] - [address]  AT [facility] [address]  [phone]    Name of the medication requested:     gabapentin (NEURONTIN) 300 MG capsule    cyclobenzaprine (FLEXERIL) 10 MG tablet    Other request:     Patient is out of medication    Per patient after surgery refills have been sent to surgeon for refill but only 100mg, pt would like refills by Dr. Murry for one 300mg dose, instead of taking 3 tablets.    Can we leave a detailed message on this number? YES    Phone number patient can be reached at: Other phone number:  [phone]    Best Time:     Call taken on 1/3/2022 at 11:05 AM by Linda Serrano

## 2022-01-04 RX ORDER — CYCLOBENZAPRINE HCL 10 MG
10 TABLET ORAL 3 TIMES DAILY PRN
Qty: 90 TABLET | Refills: 3 | Status: CANCELLED | OUTPATIENT
Start: 2022-01-04

## 2022-01-04 RX ORDER — GABAPENTIN 300 MG/1
CAPSULE ORAL
Qty: 90 CAPSULE | Refills: 3 | Status: CANCELLED | OUTPATIENT
Start: 2022-01-04

## 2022-01-05 RX ORDER — CYCLOBENZAPRINE HCL 10 MG
10 TABLET ORAL 3 TIMES DAILY PRN
Qty: 90 TABLET | Refills: 3 | Status: SHIPPED | OUTPATIENT
Start: 2022-01-05 | End: 2022-05-04

## 2022-01-05 RX ORDER — GABAPENTIN 300 MG/1
CAPSULE ORAL
Qty: 90 CAPSULE | Refills: 3 | Status: SHIPPED | OUTPATIENT
Start: 2022-01-05 | End: 2022-12-26

## 2022-01-05 NOTE — TELEPHONE ENCOUNTER
Reason for Call:  Other returning call and prescription    Detailed comments: Pt is returning call - Pt has gotten these prescribed by Dr Murry before. Pt has had this shoulder injury for awhile now. Pt had recent surgery in Nov but the medications that the surgeon put him on are the same medications Dr Murry prescribed him. He did get in touch with the surgeon team and they said they do not fill these on regular basis and was told to touch base with primary care to coordinate these medications. Pt requested that if you look into his past med list or history, Dr Murry has prescribed these medication for years. He just wants it renewed because he is all out. Please advise.    Phone Number Patient can be reached at: Home number on file [phone] (home)    Best Time: any    Can we leave a detailed message on this number? YES    Call taken on 1/5/2022 at 12:07 PM by Aaron Palomino

## 2022-01-21 DIAGNOSIS — M75.21 BICEPS TENDONITIS, RIGHT: Primary | ICD-10-CM

## 2022-01-24 RX ORDER — NAPROXEN 500 MG/1
TABLET ORAL
Qty: 180 TABLET | Refills: 0 | Status: SHIPPED | OUTPATIENT
Start: 2022-01-24 | End: 2022-03-04

## 2022-01-24 NOTE — TELEPHONE ENCOUNTER
"Routing refill request to provider for review/approval because:  Labs out of range:  CBC  Med last reported as historical.    Last Written Prescription Date:  ?  Last Fill Quantity: ?,  # refills: ?   Last office visit provider: 11/15/21    Requested Prescriptions   Pending Prescriptions Disp Refills     naproxen (NAPROSYN) 500 MG tablet [Pharmacy Med Name: NAPROXEN 500MG TABLETS] 180 tablet      Sig: TAKE 1 TABLET(500 MG) BY MOUTH TWICE DAILY AS NEEDED       NSAID Medications Failed - 1/21/2022  5:13 AM        Failed - Normal CBC on file in past 12 months     Recent Labs   Lab Test 03/30/21  0607   WBC 7.2   RBC 4.41   HGB 13.0*   HCT 37.8*                    Passed - Blood pressure under 140/90 in past 12 months     BP Readings from Last 3 Encounters:   11/15/21 112/80   06/04/21 112/80   05/10/21 132/80                 Passed - Normal ALT on file in past 12 months     Recent Labs   Lab Test 03/30/21  0607   ALT 19             Passed - Normal AST on file in past 12 months     Recent Labs   Lab Test 03/30/21  0607   AST 17             Passed - Recent (12 mo) or future (30 days) visit within the authorizing provider's specialty     Patient has had an office visit with the authorizing provider or a provider within the authorizing providers department within the previous 12 mos or has a future within next 30 days. See \"Patient Info\" tab in inbasket, or \"Choose Columns\" in Meds & Orders section of the refill encounter.              Passed - Patient is age 6-64 years        Passed - Medication is active on med list        Passed - Normal serum creatinine on file in past 12 months     Recent Labs   Lab Test 03/30/21  0607   CR 0.85       Ok to refill medication if creatinine is low               Delmy Schofield RN 01/23/22 7:37 PM  "

## 2022-01-25 ENCOUNTER — OFFICE VISIT (OUTPATIENT)
Dept: INTERNAL MEDICINE | Facility: CLINIC | Age: 52
End: 2022-01-25
Payer: COMMERCIAL

## 2022-01-25 VITALS — HEART RATE: 79 BPM | OXYGEN SATURATION: 98 % | DIASTOLIC BLOOD PRESSURE: 84 MMHG | SYSTOLIC BLOOD PRESSURE: 119 MMHG

## 2022-01-25 DIAGNOSIS — M95.8 WINGED SCAPULA: Primary | ICD-10-CM

## 2022-01-25 PROCEDURE — 99207 PR NON-BILLABLE SERV PER CHARTING: CPT | Performed by: INTERNAL MEDICINE

## 2022-01-25 NOTE — PROGRESS NOTES
Layo comes in today for follow-up of his anterior and posterior shoulder pain.  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  He had some anterior shoulder pain and underwent a biceps tenotomy in November 2021.  He states that he has no anterior shoulder pain after the surgery and he is happy with his result, but he still has chronic shoulder pain.  He apparently was told that he needs a PA for both his tramadol and alprazolam which we can do.  He is also looking to go to physical therapy for his posterior shoulder pain and a referral was placed.  No charge visit.

## 2022-02-03 DIAGNOSIS — M12.9 ARTHROPATHY, MULTIPLE SITES: ICD-10-CM

## 2022-02-03 DIAGNOSIS — R20.8 DYSESTHESIA: ICD-10-CM

## 2022-02-03 RX ORDER — ALPRAZOLAM 1 MG
TABLET ORAL
Qty: 90 TABLET | Refills: 0 | Status: SHIPPED | OUTPATIENT
Start: 2022-02-03 | End: 2022-05-04

## 2022-02-03 RX ORDER — TRAMADOL HYDROCHLORIDE 50 MG/1
TABLET ORAL
Qty: 30 TABLET | Refills: 0 | Status: SHIPPED | OUTPATIENT
Start: 2022-02-03 | End: 2022-03-04

## 2022-02-07 ENCOUNTER — TELEPHONE (OUTPATIENT)
Dept: INTERNAL MEDICINE | Facility: CLINIC | Age: 52
End: 2022-02-07
Payer: COMMERCIAL

## 2022-02-07 NOTE — TELEPHONE ENCOUNTER
Prior Authorization Approval    Authorization Effective Date: 1/8/2022  Authorization Expiration Date: 2/7/2023  Medication: traMADol HCl 50MG tablets  Approved Dose/Quantity:   Reference #:     Insurance Company: [email or web address] - Phone [phone] Fax [phone]  Expected CoPay:       CoPay Card Available:      Foundation Assistance Needed:    Which Pharmacy is filling the prescription (Not needed for infusion/clinic administered): [id number] [place] [id number] AT [facility] [address] [facility]  Pharmacy Notified: Yes  Patient Notified: Yes

## 2022-02-07 NOTE — TELEPHONE ENCOUNTER
Received call from PA team line, patient requesting PA be done for Tramadol.       Central Prior Authorization Team   Phone: [phone]    PA Initiation    Medication: traMADol HCl 50MG tablets  Insurance Company: [employer] - Phone [phone] Fax [phone]  Pharmacy Filling the Rx: [facility] #[id number] [address]  AT [facility] [address] [facility]  Filling Pharmacy Phone: [phone]  Filling Pharmacy Fax:    Start Date: 2/7/2022

## 2022-02-15 DIAGNOSIS — M12.9 ARTHROPATHY, MULTIPLE SITES: ICD-10-CM

## 2022-02-15 RX ORDER — TRAMADOL HYDROCHLORIDE 50 MG/1
50 TABLET ORAL DAILY
Qty: 30 TABLET | Refills: 3 | OUTPATIENT
Start: 2022-02-15

## 2022-03-03 DIAGNOSIS — M75.21 BICEPS TENDONITIS, RIGHT: ICD-10-CM

## 2022-03-03 DIAGNOSIS — M12.9 ARTHROPATHY, MULTIPLE SITES: ICD-10-CM

## 2022-03-04 RX ORDER — TRAMADOL HYDROCHLORIDE 50 MG/1
TABLET ORAL
Qty: 30 TABLET | Refills: 0 | Status: SHIPPED | OUTPATIENT
Start: 2022-03-04 | End: 2022-04-05

## 2022-03-04 RX ORDER — NAPROXEN 500 MG/1
TABLET ORAL
Qty: 180 TABLET | Refills: 0 | Status: SHIPPED | OUTPATIENT
Start: 2022-03-04 | End: 2022-06-03

## 2022-04-01 DIAGNOSIS — M12.9 ARTHROPATHY, MULTIPLE SITES: ICD-10-CM

## 2022-04-03 NOTE — TELEPHONE ENCOUNTER
Routing refill request to provider for review/approval because:  Drug not on the [facility] refill protocol controlled substance    Last Written Prescription Date:  03/04/2022  Last Fill Quantity: 30,  # refills: 0   Last office visit provider:  1/25/2022     Requested Prescriptions   Pending Prescriptions Disp Refills     traMADol (ULTRAM) 50 MG tablet [Pharmacy Med Name: TRAMADOL 50MG TABLETS] 30 tablet      Sig: TAKE 1 TABLET BY MOUTH EVERY DAY       There is no refill protocol information for this order          Jeannine Pope LPN 04/03/22 3:31 PM

## 2022-04-05 RX ORDER — TRAMADOL HYDROCHLORIDE 50 MG/1
TABLET ORAL
Qty: 30 TABLET | Refills: 3 | Status: SHIPPED | OUTPATIENT
Start: 2022-04-05 | End: 2022-08-02

## 2022-05-02 DIAGNOSIS — M95.8 WINGED SCAPULA: ICD-10-CM

## 2022-05-02 DIAGNOSIS — R68.2 DRY MOUTH: ICD-10-CM

## 2022-05-02 DIAGNOSIS — R20.8 DYSESTHESIA: ICD-10-CM

## 2022-05-04 RX ORDER — ALPRAZOLAM 1 MG
TABLET ORAL
Qty: 90 TABLET | Refills: 0 | Status: SHIPPED | OUTPATIENT
Start: 2022-05-04 | End: 2022-08-02

## 2022-05-04 RX ORDER — CYCLOBENZAPRINE HCL 10 MG
TABLET ORAL
Qty: 90 TABLET | Refills: 3 | Status: SHIPPED | OUTPATIENT
Start: 2022-05-04 | End: 2022-09-02

## 2022-05-04 RX ORDER — FLUTICASONE PROPIONATE 50 MCG
SPRAY, SUSPENSION (ML) NASAL
Qty: 48 G | Refills: 3 | Status: SHIPPED | OUTPATIENT
Start: 2022-05-04 | End: 2023-10-23

## 2022-05-04 NOTE — TELEPHONE ENCOUNTER
PT called to check on that status of these 3 refill requests. TC advised we received the refill request on 05/02/2022 and the turn around time is 1-3 business days. PT states he is going out of [facility] for mothers day weekend and he is hoping these Rx refills can be expedited and sent to the pharmacy today. Please call Pt once the Rx's have been sent so he knows when he can go and pick them up.    Sherrie Liu

## 2022-05-26 ENCOUNTER — OFFICE VISIT (OUTPATIENT)
Dept: INTERNAL MEDICINE | Facility: CLINIC | Age: 52
End: 2022-05-26
Payer: COMMERCIAL

## 2022-05-26 VITALS
OXYGEN SATURATION: 98 % | WEIGHT: 185 LBS | HEIGHT: 69 IN | BODY MASS INDEX: 27.4 KG/M2 | HEART RATE: 83 BPM | DIASTOLIC BLOOD PRESSURE: 78 MMHG | SYSTOLIC BLOOD PRESSURE: 120 MMHG

## 2022-05-26 DIAGNOSIS — R51.9 OCCIPITAL HEADACHE: Primary | ICD-10-CM

## 2022-05-26 PROCEDURE — 99214 OFFICE O/P EST MOD 30 MIN: CPT | Performed by: INTERNAL MEDICINE

## 2022-05-26 NOTE — PROGRESS NOTES
"  Assessment & Plan   Problem List Items Addressed This Visit    None     Visit Diagnoses     Occipital headache    -  Primary    Relevant Orders    Physical Therapy Referral           Occipital headaches and trapezial pain secondary to trapezius spasm likely due to his chronic shoulder issues on the right.  I am going to refer him to physical therapy as I believe he will benefit greatly from strengthening the muscles surrounding his area of issue.  We also talked about opioid induced hyperalgesia contributing to his symptoms.  He has tried to come off of his tramadol but his shoulder pain worsens almost immediately.  I explained that hyperalgesia may be something that we would need to deal with down the road which she understands.  Follow-up with us as needed.    No follow-ups on file.    Lan Murry MD  [facility]    Pallavi Villalpando is a very pleasant 52-year-old who comes in today for evaluation of neck pain and headaches.  Has been having symptoms for about the last 2 weeks.  He has pain in the bilateral superior aspect of the trapezius muscle and the occiput bilaterally.  He then has headaches which start at the occiput and move forward to his forehead in a bandlike fashion.  He states that this is been getting progressively worse over the last 2 weeks.  He has not been taking anything for the pain except his medications for his chronic right shoulder pain (tramadol and gabapentin).  Otherwise doing well.  Of note he has been on tramadol for his shoulder for a number of years.      Objective    /78 (BP Location: Right arm, Patient Position: Sitting, Cuff Size: Adult Regular)   Pulse 83   Ht 1.753 m (5' 9\")   Wt 83.9 kg (185 lb)   SpO2 98%   BMI 27.32 kg/m    Body mass index is 27.32 kg/m .  Physical Exam     Musculoskeletal: Significantly increased tone in the bilateral superior aspect of the trapezius.  Palpation of the insertion of the trapezius musculature " at the occiput reproduces his symptoms.

## 2022-06-02 DIAGNOSIS — M75.21 BICEPS TENDONITIS, RIGHT: ICD-10-CM

## 2022-06-03 RX ORDER — NAPROXEN 500 MG/1
TABLET ORAL
Qty: 180 TABLET | Refills: 0 | Status: SHIPPED | OUTPATIENT
Start: 2022-06-03 | End: 2022-09-02

## 2022-06-03 NOTE — TELEPHONE ENCOUNTER
Routing refill request to provider for review/approval because:  Labs out of range:  AST, ALT, CBC          Recent Labs   Lab Test 03/30/21  0607   ALT 19           Recent Labs   Lab Test 03/30/21  0607   AST 17           Recent Labs   Lab Test 03/30/21  0607   WBC 7.2   RBC 4.41   HGB 13.0*   HCT 37.8*               Recent Labs   Lab Test 03/30/21  0607   CR 0.85   Caity Rocha RN  [facility]

## 2022-08-02 DIAGNOSIS — M12.9 ARTHROPATHY, MULTIPLE SITES: ICD-10-CM

## 2022-08-02 DIAGNOSIS — R20.8 DYSESTHESIA: ICD-10-CM

## 2022-08-02 RX ORDER — TRAMADOL HYDROCHLORIDE 50 MG/1
TABLET ORAL
Qty: 30 TABLET | Refills: 3 | Status: SHIPPED | OUTPATIENT
Start: 2022-08-02 | End: 2022-12-02

## 2022-08-02 RX ORDER — ALPRAZOLAM 1 MG
TABLET ORAL
Qty: 90 TABLET | Refills: 0 | Status: SHIPPED | OUTPATIENT
Start: 2022-08-02 | End: 2022-11-04

## 2022-08-02 NOTE — TELEPHONE ENCOUNTER
Routing refill request to provider for review/approval because:  Drug not on the [facility] refill protocol: controlled substances. Please advise on refills.   Last OV 5/26/22    Alprazolam  Last Written Prescription Date: 5/4/22  Last Fill Quantity: 90,  # refills: 0   Last office visit: 5/26/2022 with prescribing provider     Tramadol  Last Written Prescription Date:  4/5/22  Last Fill Quantity: 30,  # refills: 3   Last office visit: 5/26/2022 with prescribing provider

## 2022-08-02 NOTE — TELEPHONE ENCOUNTER
Patient is calling to check on status of RX. Informed patient that there is a 72 hour turn around for medications. He is leaving tomorrow at 10am and would like this filled before he leaves.  Thank you

## 2022-08-07 ENCOUNTER — HEALTH MAINTENANCE LETTER (OUTPATIENT)
Age: 52
End: 2022-08-07

## 2022-08-21 NOTE — TELEPHONE ENCOUNTER
Pt informed, he will call over to [place] and if he in unable to find another provider to do the surgery, he will let us know   Alert-The patient is alert, awake and responds to voice. The patient is oriented to time, place, and person. The triage nurse is able to obtain subjective information.

## 2022-09-01 DIAGNOSIS — M75.21 BICEPS TENDONITIS, RIGHT: ICD-10-CM

## 2022-09-01 DIAGNOSIS — M95.8 WINGED SCAPULA: ICD-10-CM

## 2022-09-02 RX ORDER — CYCLOBENZAPRINE HCL 10 MG
TABLET ORAL
Qty: 90 TABLET | Refills: 3 | Status: SHIPPED | OUTPATIENT
Start: 2022-09-02 | End: 2022-12-31

## 2022-09-02 RX ORDER — NAPROXEN 500 MG/1
TABLET ORAL
Qty: 180 TABLET | Refills: 0 | Status: SHIPPED | OUTPATIENT
Start: 2022-09-02 | End: 2022-11-06

## 2022-09-02 NOTE — TELEPHONE ENCOUNTER
"Routing refill request to provider for review/approval because:  Drug not on the [facility] refill protocol   Labs not current:  Multiple    Last Written Prescription Date:  6/3/22  Last Fill Quantity: 180,  # refills: 0   Last office visit provider:  5/26/22    Last Written Prescription Date:  5/4/22  Last Fill Quantity: 90,  # refills: 3   Last office visit provider:  5/26/22     Requested Prescriptions   Pending Prescriptions Disp Refills     naproxen (NAPROSYN) 500 MG tablet [Pharmacy Med Name: NAPROXEN 500MG TABLETS] 180 tablet 0     Sig: TAKE 1 TABLET(500 MG) BY MOUTH TWICE DAILY AS NEEDED       NSAID Medications Failed - 9/1/2022  9:12 AM        Failed - Normal ALT on file in past 12 months     Recent Labs   Lab Test 03/30/21  0607   ALT 19             Failed - Normal AST on file in past 12 months     Recent Labs   Lab Test 03/30/21  0607   AST 17             Failed - Normal CBC on file in past 12 months     Recent Labs   Lab Test 03/30/21  0607   WBC 7.2   RBC 4.41   HGB 13.0*   HCT 37.8*                    Failed - Normal serum creatinine on file in past 12 months     Recent Labs   Lab Test 03/30/21  0607   CR 0.85       Ok to refill medication if creatinine is low          Passed - Blood pressure under 140/90 in past 12 months     BP Readings from Last 3 Encounters:   05/26/22 120/78   01/25/22 119/84   11/15/21 112/80                 Passed - Recent (12 mo) or future (30 days) visit within the authorizing provider's specialty     Patient has had an office visit with the authorizing provider or a provider within the authorizing providers department within the previous 12 mos or has a future within next 30 days. See \"Patient Info\" tab in inbasket, or \"Choose Columns\" in Meds & Orders section of the refill encounter.              Passed - Patient is age 6-64 years        Passed - Medication is active on med list           cyclobenzaprine (FLEXERIL) 10 MG tablet [Pharmacy Med Name: CYCLOBENZAPRINE 10MG " TABLETS] 90 tablet 3     Sig: TAKE 1 TABLET(10 MG) BY MOUTH THREE TIMES DAILY AS NEEDED       There is no refill protocol information for this order          Tiago Elkins RN 09/02/22 7:15 AM

## 2022-10-23 ENCOUNTER — HEALTH MAINTENANCE LETTER (OUTPATIENT)
Age: 52
End: 2022-10-23

## 2022-11-04 DIAGNOSIS — K22.5 ZENKER'S DIVERTICULUM: ICD-10-CM

## 2022-11-04 DIAGNOSIS — M75.21 BICEPS TENDONITIS, RIGHT: ICD-10-CM

## 2022-11-04 DIAGNOSIS — R20.8 DYSESTHESIA: ICD-10-CM

## 2022-11-06 RX ORDER — NAPROXEN 500 MG/1
TABLET ORAL
Qty: 180 TABLET | Refills: 0 | Status: SHIPPED | OUTPATIENT
Start: 2022-11-06 | End: 2023-01-31

## 2022-11-06 RX ORDER — MECOBALAMIN 5000 MCG
TABLET,DISINTEGRATING ORAL
Qty: 45 CAPSULE | Refills: 3 | Status: SHIPPED | OUTPATIENT
Start: 2022-11-06 | End: 2024-05-02

## 2022-11-06 RX ORDER — ALPRAZOLAM 1 MG
TABLET ORAL
Qty: 90 TABLET | OUTPATIENT
Start: 2022-11-06

## 2022-11-06 NOTE — TELEPHONE ENCOUNTER
"Routing refill request to provider for review/approval because:  Labs not current:  ALT, AST, CBC, Serum creatinine.-Naprosyn  Duplicate-Xanax    Last Written Prescription Date:  09/02/2022-naproxen  Last Fill Quantity: 180,  # refills: 0   Last office visit provider:  05/26/2022      Last Written Prescription Date:  11/04/2022-alprazolam  Already sent.  Last Fill Quantity: 90,  # refills: 0   Last office visit provider:  05/26/2022      Requested Prescriptions   Pending Prescriptions Disp Refills     ALPRAZolam (XANAX) 1 MG tablet [Pharmacy Med Name: ALPRAZOLAM 1MG TABLETS] 90 tablet      Sig: TAKE 1 TABLET BY MOUTH DAILY       There is no refill protocol information for this order        LANsoprazole (PREVACID) 15 MG DR capsule [Pharmacy Med Name: LANSOPRAZOLE 15MG DR CAPSULES] 45 capsule 2     Sig: TAKE 1 CAPSULE(15 MG) BY MOUTH EVERY OTHER DAY       PPI Protocol Passed - 11/4/2022 10:07 AM        Passed - Not on Clopidogrel (unless Pantoprazole ordered)        Passed - No diagnosis of osteoporosis on record        Passed - Recent (12 mo) or future (30 days) visit within the authorizing provider's specialty     Patient has had an office visit with the authorizing provider or a provider within the authorizing providers department within the previous 12 mos or has a future within next 30 days. See \"Patient Info\" tab in inbasket, or \"Choose Columns\" in Meds & Orders section of the refill encounter.              Passed - Medication is active on med list        Passed - Patient is age 18 or older           naproxen (NAPROSYN) 500 MG tablet [Pharmacy Med Name: NAPROXEN 500MG TABLETS] 180 tablet 0     Sig: TAKE 1 TABLET(500 MG) BY MOUTH TWICE DAILY AS NEEDED       NSAID Medications Failed - 11/4/2022 10:07 AM        Failed - Normal ALT on file in past 12 months     Recent Labs   Lab Test 03/30/21 0607   ALT 19             Failed - Normal AST on file in past 12 months     Recent Labs   Lab Test 03/30/21 0607   AST 17 " "            Failed - Normal CBC on file in past 12 months     Recent Labs   Lab Test 03/30/21  0607   WBC 7.2   RBC 4.41   HGB 13.0*   HCT 37.8*                    Failed - Normal serum creatinine on file in past 12 months     Recent Labs   Lab Test 03/30/21  0607   CR 0.85       Ok to refill medication if creatinine is low          Passed - Blood pressure under 140/90 in past 12 months     BP Readings from Last 3 Encounters:   05/26/22 120/78   01/25/22 119/84   11/15/21 112/80                 Passed - Recent (12 mo) or future (30 days) visit within the authorizing provider's specialty     Patient has had an office visit with the authorizing provider or a provider within the authorizing providers department within the previous 12 mos or has a future within next 30 days. See \"Patient Info\" tab in inbasket, or \"Choose Columns\" in Meds & Orders section of the refill encounter.              Passed - Patient is age 6-64 years        Passed - Medication is active on med list             Last Written Prescription Date:  10/04/2021-lansoprazole  Last Fill Quantity: 45,  # refills: 2   Last office visit provider:  05/26/2022     Requested Prescriptions   Pending Prescriptions Disp Refills     ALPRAZolam (XANAX) 1 MG tablet [Pharmacy Med Name: ALPRAZOLAM 1MG TABLETS] 90 tablet      Sig: TAKE 1 TABLET BY MOUTH DAILY       There is no refill protocol information for this order        LANsoprazole (PREVACID) 15 MG DR capsule [Pharmacy Med Name: LANSOPRAZOLE 15MG DR CAPSULES] 45 capsule 2     Sig: TAKE 1 CAPSULE(15 MG) BY MOUTH EVERY OTHER DAY       PPI Protocol Passed - 11/4/2022 10:07 AM        Passed - Not on Clopidogrel (unless Pantoprazole ordered)        Passed - No diagnosis of osteoporosis on record        Passed - Recent (12 mo) or future (30 days) visit within the authorizing provider's specialty     Patient has had an office visit with the authorizing provider or a provider within the authorizing providers " "department within the previous 12 mos or has a future within next 30 days. See \"Patient Info\" tab in inbasket, or \"Choose Columns\" in Meds & Orders section of the refill encounter.              Passed - Medication is active on med list        Passed - Patient is age 18 or older           naproxen (NAPROSYN) 500 MG tablet [Pharmacy Med Name: NAPROXEN 500MG TABLETS] 180 tablet 0     Sig: TAKE 1 TABLET(500 MG) BY MOUTH TWICE DAILY AS NEEDED       NSAID Medications Failed - 11/4/2022 10:07 AM        Failed - Normal ALT on file in past 12 months     Recent Labs   Lab Test 03/30/21  0607   ALT 19             Failed - Normal AST on file in past 12 months     Recent Labs   Lab Test 03/30/21  0607   AST 17             Failed - Normal CBC on file in past 12 months     Recent Labs   Lab Test 03/30/21  0607   WBC 7.2   RBC 4.41   HGB 13.0*   HCT 37.8*                    Failed - Normal serum creatinine on file in past 12 months     Recent Labs   Lab Test 03/30/21  0607   CR 0.85       Ok to refill medication if creatinine is low          Passed - Blood pressure under 140/90 in past 12 months     BP Readings from Last 3 Encounters:   05/26/22 120/78   01/25/22 119/84   11/15/21 112/80                 Passed - Recent (12 mo) or future (30 days) visit within the authorizing provider's specialty     Patient has had an office visit with the authorizing provider or a provider within the authorizing providers department within the previous 12 mos or has a future within next 30 days. See \"Patient Info\" tab in inbasket, or \"Choose Columns\" in Meds & Orders section of the refill encounter.              Passed - Patient is age 6-64 years        Passed - Medication is active on med list             Julita Boykin 11/06/22 8:43 AM  "

## 2022-12-02 DIAGNOSIS — M12.9 ARTHROPATHY, MULTIPLE SITES: ICD-10-CM

## 2022-12-02 RX ORDER — TRAMADOL HYDROCHLORIDE 50 MG/1
50 TABLET ORAL DAILY PRN
Qty: 30 TABLET | Refills: 0 | Status: SHIPPED | OUTPATIENT
Start: 2022-12-02 | End: 2023-01-03

## 2022-12-02 NOTE — TELEPHONE ENCOUNTER
Rx sent today:  traMADol (ULTRAM) 50 MG tablet 30 tablet 0 12/2/2022  No   Sig - Route: Take 1 tablet (50 mg) by mouth daily as needed for moderate to severe pain - Oral       Confirmed by pharmacy.

## 2022-12-25 DIAGNOSIS — M95.8 WINGED SCAPULA: ICD-10-CM

## 2022-12-26 RX ORDER — GABAPENTIN 300 MG/1
CAPSULE ORAL
Qty: 90 CAPSULE | Refills: 3 | Status: SHIPPED | OUTPATIENT
Start: 2022-12-26

## 2022-12-30 DIAGNOSIS — M95.8 WINGED SCAPULA: ICD-10-CM

## 2022-12-30 DIAGNOSIS — M12.9 ARTHROPATHY, MULTIPLE SITES: ICD-10-CM

## 2022-12-31 RX ORDER — CYCLOBENZAPRINE HCL 10 MG
TABLET ORAL
Qty: 90 TABLET | Refills: 3 | Status: SHIPPED | OUTPATIENT
Start: 2022-12-31 | End: 2023-05-02

## 2023-01-03 RX ORDER — TRAMADOL HYDROCHLORIDE 50 MG/1
50 TABLET ORAL DAILY PRN
Qty: 30 TABLET | Refills: 0 | Status: SHIPPED | OUTPATIENT
Start: 2023-01-03 | End: 2023-01-31

## 2023-01-03 NOTE — TELEPHONE ENCOUNTER
Per RN advised to fwd to Dr. Kidd; as he is covering PCP for Dr. Murry.  Pt stated has last dose for this evening and then will be out.

## 2023-01-31 DIAGNOSIS — M75.21 BICEPS TENDONITIS, RIGHT: ICD-10-CM

## 2023-01-31 DIAGNOSIS — R20.8 DYSESTHESIA: ICD-10-CM

## 2023-01-31 DIAGNOSIS — M12.9 ARTHROPATHY, MULTIPLE SITES: ICD-10-CM

## 2023-01-31 RX ORDER — ALPRAZOLAM 1 MG
TABLET ORAL
Qty: 90 TABLET | Refills: 1 | Status: SHIPPED | OUTPATIENT
Start: 2023-01-31 | End: 2023-05-26

## 2023-01-31 RX ORDER — TRAMADOL HYDROCHLORIDE 50 MG/1
TABLET ORAL
Qty: 30 TABLET | Refills: 1 | Status: SHIPPED | OUTPATIENT
Start: 2023-01-31 | End: 2023-03-03

## 2023-01-31 RX ORDER — NAPROXEN 500 MG/1
TABLET ORAL
Qty: 180 TABLET | Refills: 0 | Status: SHIPPED | OUTPATIENT
Start: 2023-01-31 | End: 2023-05-02

## 2023-01-31 NOTE — TELEPHONE ENCOUNTER
Pt states this is High Priority and would like these addressed ASAP; as pt will be out as of tomorrow.

## 2023-03-01 ENCOUNTER — TELEPHONE (OUTPATIENT)
Dept: INTERNAL MEDICINE | Facility: CLINIC | Age: 53
End: 2023-03-01
Payer: MEDICAID

## 2023-03-02 ENCOUNTER — OFFICE VISIT (OUTPATIENT)
Dept: INTERNAL MEDICINE | Facility: CLINIC | Age: 53
End: 2023-03-02
Payer: COMMERCIAL

## 2023-03-02 VITALS
BODY MASS INDEX: 27.32 KG/M2 | DIASTOLIC BLOOD PRESSURE: 72 MMHG | OXYGEN SATURATION: 99 % | HEART RATE: 89 BPM | SYSTOLIC BLOOD PRESSURE: 106 MMHG | HEIGHT: 69 IN | TEMPERATURE: 97.5 F

## 2023-03-02 DIAGNOSIS — H69.93 DYSFUNCTION OF BOTH EUSTACHIAN TUBES: Primary | ICD-10-CM

## 2023-03-02 PROCEDURE — 99213 OFFICE O/P EST LOW 20 MIN: CPT | Mod: 25 | Performed by: INTERNAL MEDICINE

## 2023-03-02 PROCEDURE — 69209 REMOVE IMPACTED EAR WAX UNI: CPT | Mod: 50 | Performed by: INTERNAL MEDICINE

## 2023-03-02 RX ORDER — AZELASTINE 1 MG/ML
1 SPRAY, METERED NASAL 2 TIMES DAILY
Qty: 30 ML | Refills: 3 | Status: SHIPPED | OUTPATIENT
Start: 2023-03-02 | End: 2023-06-05

## 2023-03-02 NOTE — PROGRESS NOTES
"  Assessment & Plan   Problem List Items Addressed This Visit    None  Visit Diagnoses     Dysfunction of both eustachian tubes    -  Primary    Relevant Medications    azelastine (ASTELIN) 0.1 % nasal spray           Bilateral eustachian tube dysfunction.  His ears were flushed out without difficulty today.  I am going to prescribe him some azelistine nasal spray to use in place of his Flonase.  If he is not improving in 1 to 2 weeks he is going to let us know and we will refer him to ENT.    No follow-ups on file.    Lan Murry MD  [facility]    Subjective     Layo comes in today for evaluation of ear congestion and sinus congestion.  He states that he has been having symptoms for the last 3 to 4 weeks.  He has been blowing his nose frequently with clearish to yellowish mucus.  He has been having some popping in the ears along with some decreased acuity of hearing as well.he states that he feels like there is \"water\" in the ears.  No fevers.  He has been having some postnasal drainage as well.  Sinus pressure.  Has had dizziness with head position changes.  He has been using Flonase and an over-the-counter antihistamine without success.  He has been on Flonase for a number of years.    Objective    There were no vitals taken for this visit.  There is no height or weight on file to calculate BMI.  Physical Exam     HEENT: Tympanic membranes are occluded by cerumen bilaterally.  No nystagmus bilaterally.  Tenderness to percussion of the frontal and maxillary sinuses.            "

## 2023-03-02 NOTE — PROGRESS NOTES
Patient identified using two patient identifiers.  Ear exam showing wax occlusion completed by provider.  Solution: warm water was placed in the bilateral ear(s) via irrigation tool: elephant ear   Dawn Rodriguez CMA  .    Answers for HPI/ROS submitted by the patient on 3/2/2023  How many servings of fruits and vegetables do you eat daily?: 2-3  On average, how many sweetened beverages do you drink each day (Examples: soda, juice, sweet tea, etc.  Do NOT count diet or artificially sweetened beverages)?: 0  How many minutes a day do you exercise enough to make your heart beat faster?: 30 to 60  How many days a week do you exercise enough to make your heart beat faster?: 4  How many days per week do you miss taking your medication?: 0  What is the reason for your visit today?: dizziness and sinus issues and ear pain  When did your symptoms begin?: More than a month

## 2023-03-03 DIAGNOSIS — M12.9 ARTHROPATHY, MULTIPLE SITES: ICD-10-CM

## 2023-03-03 NOTE — TELEPHONE ENCOUNTER
Patient called in and needs his Tramadol refilled. He is completely out.    Please call with any questions- [phone]

## 2023-03-05 ENCOUNTER — MYC MEDICAL ADVICE (OUTPATIENT)
Dept: INTERNAL MEDICINE | Facility: CLINIC | Age: 53
End: 2023-03-05
Payer: MEDICAID

## 2023-03-05 DIAGNOSIS — H69.93 DYSFUNCTION OF BOTH EUSTACHIAN TUBES: Primary | ICD-10-CM

## 2023-03-06 RX ORDER — TRAMADOL HYDROCHLORIDE 50 MG/1
TABLET ORAL
Qty: 30 TABLET | Refills: 1 | Status: SHIPPED | OUTPATIENT
Start: 2023-03-06 | End: 2023-05-26

## 2023-03-06 NOTE — TELEPHONE ENCOUNTER
Central Prior Authorization Team  Phone: [phone]    PA Initiation    Medication: traMADol (ULTRAM) 50 MG tablet  Insurance Company: [employer] - Phone [phone] Fax [phone]  Pharmacy Filling the Rx: [id number] [place] [id number] [facility] [place]  [facility]  Filling Pharmacy Phone: [phone]  Filling Pharmacy Fax:    Start Date: 3/6/2023

## 2023-03-06 NOTE — TELEPHONE ENCOUNTER
Prior Authorization Approval    Authorization Effective Date: 2/4/2023  Authorization Expiration Date: 3/5/2024  Medication: traMADol (ULTRAM) 50 MG tablet- APPROVED   Approved Dose/Quantity:   Reference #:     Insurance Company: Identify - Phone [phone] Fax [phone]  Expected CoPay:       CoPay Card Available:      Foundation Assistance Needed:    Which Pharmacy is filling the prescription (Not needed for infusion/clinic administered): [id number] [place] [id number] [address] AT [facility]  Pharmacy Notified: Yes  Patient Notified:  **Instructed pharmacy to notify patient when script is ready to /ship.**

## 2023-05-02 ENCOUNTER — PATIENT OUTREACH (OUTPATIENT)
Dept: INTERNAL MEDICINE | Facility: CLINIC | Age: 53
End: 2023-05-02
Payer: MEDICAID

## 2023-05-02 ENCOUNTER — MYC MEDICAL ADVICE (OUTPATIENT)
Dept: INTERNAL MEDICINE | Facility: CLINIC | Age: 53
End: 2023-05-02
Payer: MEDICAID

## 2023-05-02 DIAGNOSIS — M95.8 WINGED SCAPULA: ICD-10-CM

## 2023-05-02 DIAGNOSIS — M75.21 BICEPS TENDONITIS, RIGHT: ICD-10-CM

## 2023-05-02 NOTE — TELEPHONE ENCOUNTER
Writer is reaching out to the patient because PCP is leaving the organization in the coming months. Care team is trying to get as many scheduled as we can to help avoid a gap in care related to their pain management needs.    Patient Quality Outreach    Patient is due for the following:   Chronic Opioid Use -  Treatment Agreement (CSA), Urine Drug Screen, YIFAN-7 and PHQ-9    Next Steps:   Schedule a office visit for chronic pain management  Patient was assigned appropriate questionnaire to complete    Type of outreach:    Phone, left message for patient/parent to call back. and Sent [employer] message.      Questions for provider review:    None     Joseline Lawson MA

## 2023-05-03 RX ORDER — CYCLOBENZAPRINE HCL 10 MG
TABLET ORAL
Qty: 90 TABLET | Refills: 3 | Status: SHIPPED | OUTPATIENT
Start: 2023-05-03

## 2023-05-03 RX ORDER — NAPROXEN 500 MG/1
TABLET ORAL
Qty: 180 TABLET | Refills: 0 | Status: SHIPPED | OUTPATIENT
Start: 2023-05-03 | End: 2023-11-30

## 2023-05-03 NOTE — TELEPHONE ENCOUNTER
"Routing refill request to provider for review/approval because:  Drug not on the [facility] refill protocol     Last Written Prescription Date:  12/31/2022  Last Fill Quantity: 90,  # refills: 3   Last office visit provider:  3/2/2023     Routing refill request to provider for review/approval because:  Labs not current:  ALT, AST, CBC,CR    Last Written Prescription Date:  1/31/2023  Last Fill Quantity: 180,  # refills: 0   Last office visit provider:  3/2/2023     Requested Prescriptions   Pending Prescriptions Disp Refills     cyclobenzaprine (FLEXERIL) 10 MG tablet 90 tablet 3       There is no refill protocol information for this order        naproxen (NAPROSYN) 500 MG tablet 180 tablet 0       NSAID Medications Failed - 5/3/2023  1:20 PM        Failed - Normal ALT on file in past 12 months     Recent Labs   Lab Test 03/30/21  0607   ALT 19             Failed - Normal AST on file in past 12 months     Recent Labs   Lab Test 03/30/21  0607   AST 17             Failed - Normal CBC on file in past 12 months     Recent Labs   Lab Test 03/30/21  0607   WBC 7.2   RBC 4.41   HGB 13.0*   HCT 37.8*                    Failed - Normal serum creatinine on file in past 12 months     Recent Labs   Lab Test 03/30/21  0607   CR 0.85       Ok to refill medication if creatinine is low          Passed - Blood pressure under 140/90 in past 12 months     BP Readings from Last 3 Encounters:   03/02/23 106/72   05/26/22 120/78   01/25/22 119/84                 Passed - Recent (12 mo) or future (30 days) visit within the authorizing provider's specialty     Patient has had an office visit with the authorizing provider or a provider within the authorizing providers department within the previous 12 mos or has a future within next 30 days. See \"Patient Info\" tab in inbasket, or \"Choose Columns\" in Meds & Orders section of the refill encounter.              Passed - Patient is age 6-64 years        Passed - Medication is active on med " list             Gloria Mark, ROSIE 05/03/23 1:21 PM

## 2023-05-25 DIAGNOSIS — M12.9 ARTHROPATHY, MULTIPLE SITES: ICD-10-CM

## 2023-05-25 DIAGNOSIS — R20.8 DYSESTHESIA: ICD-10-CM

## 2023-05-26 RX ORDER — TRAMADOL HYDROCHLORIDE 50 MG/1
TABLET ORAL
Qty: 30 TABLET | Refills: 0 | Status: SHIPPED | OUTPATIENT
Start: 2023-05-26 | End: 2023-07-06

## 2023-05-26 RX ORDER — ALPRAZOLAM 1 MG
1 TABLET ORAL DAILY
Qty: 90 TABLET | Refills: 1 | Status: SHIPPED | OUTPATIENT
Start: 2023-05-26

## 2023-05-26 NOTE — TELEPHONE ENCOUNTER
Pt calling to check on scripts. He would like them filled before the end of the month since his insurance goes inactive on 6/1. Please send asap

## 2023-06-05 ENCOUNTER — OFFICE VISIT (OUTPATIENT)
Dept: INTERNAL MEDICINE | Facility: CLINIC | Age: 53
End: 2023-06-05
Payer: MEDICAID

## 2023-06-05 VITALS
SYSTOLIC BLOOD PRESSURE: 121 MMHG | OXYGEN SATURATION: 97 % | DIASTOLIC BLOOD PRESSURE: 76 MMHG | RESPIRATION RATE: 16 BRPM | HEART RATE: 80 BPM

## 2023-06-05 DIAGNOSIS — K22.5 ZENKER'S DIVERTICULUM: ICD-10-CM

## 2023-06-05 DIAGNOSIS — R68.2 DRY MOUTH: ICD-10-CM

## 2023-06-05 DIAGNOSIS — M95.8 WINGED SCAPULA: ICD-10-CM

## 2023-06-05 PROCEDURE — 99207 PR NO CHARGE LOS: CPT | Performed by: INTERNAL MEDICINE

## 2023-06-05 RX ORDER — LIFITEGRAST 50 MG/ML
1 SOLUTION/ DROPS OPHTHALMIC
Qty: 60 EACH | Status: CANCELLED | OUTPATIENT
Start: 2023-06-05

## 2023-06-05 RX ORDER — SUMATRIPTAN 50 MG/1
50 TABLET, FILM COATED ORAL
Status: CANCELLED | OUTPATIENT
Start: 2023-06-05

## 2023-06-05 RX ORDER — FLUTICASONE PROPIONATE 50 MCG
2 SPRAY, SUSPENSION (ML) NASAL DAILY
Qty: 48 G | Refills: 3 | Status: CANCELLED | OUTPATIENT
Start: 2023-06-05

## 2023-06-05 RX ORDER — GABAPENTIN 300 MG/1
CAPSULE ORAL
Qty: 90 CAPSULE | Refills: 3 | Status: CANCELLED | OUTPATIENT
Start: 2023-06-05

## 2023-06-05 RX ORDER — MECOBALAMIN 5000 MCG
TABLET,DISINTEGRATING ORAL
Qty: 45 CAPSULE | Refills: 3 | Status: CANCELLED | OUTPATIENT
Start: 2023-06-05

## 2023-06-05 RX ORDER — LIDOCAINE 50 MG/G
OINTMENT TOPICAL
Qty: 35.44 G | Refills: 3 | Status: CANCELLED | OUTPATIENT
Start: 2023-06-05

## 2023-06-05 NOTE — PROGRESS NOTES
"Layo told to come in for \"pain management\".  Did not need refills of anything.  Pain is stable.  Knows I will be leaving my practice in 3-4 weeks, patient will be following me to my new practice        " Abdominal Pain, N/V/D

## 2023-07-06 DIAGNOSIS — M12.9 ARTHROPATHY, MULTIPLE SITES: ICD-10-CM

## 2023-07-06 RX ORDER — TRAMADOL HYDROCHLORIDE 50 MG/1
TABLET ORAL
Qty: 30 TABLET | Refills: 0 | Status: SHIPPED | OUTPATIENT
Start: 2023-07-06

## 2023-08-14 NOTE — ADDENDUM NOTE
Addendum Note by Lan Murry MD at 2/21/2019  4:20 PM     Author: Lan Murry MD Service: -- Author Type: Physician    Filed: 3/6/2019 10:36 AM Encounter Date: 2/21/2019 Status: Signed    : Lan Murry MD (Physician)    Addended by: LAN MURRY on: 3/6/2019 10:36 AM        Modules accepted: Orders        
no

## 2023-09-02 ENCOUNTER — HEALTH MAINTENANCE LETTER (OUTPATIENT)
Age: 53
End: 2023-09-02

## 2023-10-21 DIAGNOSIS — R68.2 DRY MOUTH: ICD-10-CM

## 2023-10-23 RX ORDER — FLUTICASONE PROPIONATE 50 MCG
SPRAY, SUSPENSION (ML) NASAL
Qty: 48 G | Refills: 1 | Status: SHIPPED | OUTPATIENT
Start: 2023-10-23

## 2023-11-30 DIAGNOSIS — M75.21 BICEPS TENDONITIS, RIGHT: ICD-10-CM

## 2023-11-30 RX ORDER — NAPROXEN 500 MG/1
TABLET ORAL
Qty: 180 TABLET | Refills: 0 | Status: SHIPPED | OUTPATIENT
Start: 2023-11-30 | End: 2024-01-03

## 2024-01-03 DIAGNOSIS — M75.21 BICEPS TENDONITIS, RIGHT: ICD-10-CM

## 2024-01-03 RX ORDER — NAPROXEN 500 MG/1
TABLET ORAL
Qty: 180 TABLET | Refills: 0 | Status: SHIPPED | OUTPATIENT
Start: 2024-01-03 | End: 2024-04-04

## 2024-02-10 DIAGNOSIS — M75.21 BICEPS TENDONITIS, RIGHT: ICD-10-CM

## 2024-02-19 NOTE — TELEPHONE ENCOUNTER
LM for patient to call back to schedule an OV -  once pt calls back, please help schedule an office visit with either hernan hammonds or dr yonathan aponte and pt can choose who he would like as PCP. Dr Murry is no longer here at [facility].

## 2024-02-26 RX ORDER — NAPROXEN 500 MG/1
TABLET ORAL
Qty: 180 TABLET | Refills: 0 | OUTPATIENT
Start: 2024-02-26

## 2024-04-04 DIAGNOSIS — M75.21 BICEPS TENDONITIS, RIGHT: ICD-10-CM

## 2024-04-04 RX ORDER — NAPROXEN 500 MG/1
TABLET ORAL
Qty: 180 TABLET | Refills: 0 | Status: SHIPPED | OUTPATIENT
Start: 2024-04-04

## 2024-05-02 DIAGNOSIS — K22.5 ZENKER'S DIVERTICULUM: ICD-10-CM

## 2024-05-02 RX ORDER — MECOBALAMIN 5000 MCG
TABLET,DISINTEGRATING ORAL
Qty: 45 CAPSULE | Refills: 3 | Status: SHIPPED | OUTPATIENT
Start: 2024-05-02

## 2024-07-01 DIAGNOSIS — M75.21 BICEPS TENDONITIS, RIGHT: ICD-10-CM

## 2024-07-01 RX ORDER — NAPROXEN 500 MG/1
TABLET ORAL
Qty: 180 TABLET | Refills: 0 | OUTPATIENT
Start: 2024-07-01

## 2024-10-26 ENCOUNTER — HEALTH MAINTENANCE LETTER (OUTPATIENT)
Age: 54
End: 2024-10-26